# Patient Record
Sex: FEMALE | Race: WHITE | NOT HISPANIC OR LATINO | URBAN - METROPOLITAN AREA
[De-identification: names, ages, dates, MRNs, and addresses within clinical notes are randomized per-mention and may not be internally consistent; named-entity substitution may affect disease eponyms.]

---

## 2020-07-06 ENCOUNTER — OFFICE VISIT (OUTPATIENT)
Dept: URBAN - METROPOLITAN AREA CLINIC 54 | Facility: CLINIC | Age: 60
End: 2020-07-06

## 2020-07-30 ENCOUNTER — WEB ENCOUNTER (OUTPATIENT)
Dept: URBAN - METROPOLITAN AREA CLINIC 54 | Facility: CLINIC | Age: 60
End: 2020-07-30

## 2020-07-30 ENCOUNTER — OFFICE VISIT (OUTPATIENT)
Dept: URBAN - METROPOLITAN AREA CLINIC 54 | Facility: CLINIC | Age: 60
End: 2020-07-30
Payer: COMMERCIAL

## 2020-07-30 DIAGNOSIS — K76.0 NAFLD (NONALCOHOLIC FATTY LIVER DISEASE): ICD-10-CM

## 2020-07-30 DIAGNOSIS — K74.60 CIRRHOSIS: ICD-10-CM

## 2020-07-30 DIAGNOSIS — Z12.11 ENCOUNTER FOR SCREENING COLONOSCOPY: ICD-10-CM

## 2020-07-30 DIAGNOSIS — D73.1 HYPERSPLENISM: ICD-10-CM

## 2020-07-30 DIAGNOSIS — E66.9 OBESITY: ICD-10-CM

## 2020-07-30 PROCEDURE — 99214 OFFICE O/P EST MOD 30 MIN: CPT | Performed by: INTERNAL MEDICINE

## 2020-07-30 RX ORDER — LISINOPRIL 20 MG/1
TAKE 1 TABLET (20 MG) BY ORAL ROUTE ONCE DAILY TABLET ORAL 1
Qty: 0 | Refills: 0 | Status: ACTIVE | COMMUNITY
Start: 1900-01-01

## 2020-07-30 RX ORDER — METFORMIN HYDROCHLORIDE 1000 MG/1
TAKE 1 TABLET (1,000 MG) BY ORAL ROUTE 2 TIMES PER DAY WITH MORNING AND EVENING MEALS TABLET, COATED ORAL 2
Qty: 0 | Refills: 0 | Status: ACTIVE | COMMUNITY
Start: 1900-01-01

## 2020-07-30 RX ORDER — LEVOTHYROXINE SODIUM 25 UG/1
TAKE ONE-HALF TABLET (12.5 MCG) BY ORAL ROUTE ONCE DAILY TABLET ORAL 1
Qty: 0 | Refills: 0 | Status: ACTIVE | COMMUNITY
Start: 1900-01-01

## 2020-07-30 RX ORDER — GLIPIZIDE 5 MG/1
TAKE 1 TABLET (5 MG) BY ORAL ROUTE ONCE DAILY BEFORE A MEAL TABLET ORAL 1
Qty: 0 | Refills: 0 | Status: ACTIVE | COMMUNITY
Start: 1900-01-01

## 2020-07-30 RX ORDER — SODIUM, POTASSIUM,MAG SULFATES 17.5-3.13G
177 ML SOLUTION, RECONSTITUTED, ORAL ORAL ONCE
Qty: 1 | Refills: 0 | OUTPATIENT
Start: 2020-07-30 | End: 2020-07-31

## 2020-07-30 NOTE — HPI-TODAY'S VISIT:
The patient is a 57 year old /White female , who presents on referral from Dr. Jarret Stanley and Brant Grigsby for a gastroenterology evaluation for above issues. Patient was referred to hematology for low WBC counts in Aug 2017. She was noted to be pancytopenic and had a negative workup including BM biopsy. 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 She carries a diagnosis of fatty liver for several years. Risk factors include DM, HTN, obesity. No use of steatogenic medications. No use use of alcohol or herbal supplements. No risk factors for acquisition of viral hepatitis. CT showed ? D1/2 lesions but patient reports having a negative EGD by Dr. Carrasco. Recent labs show WBC 2.1, Hb 11.9, PLT 49. Last colonoscopy was 3 years ago when 2-3 polyps were removed. No signs/symptoms of advanced liver disease. She c/o fatigue and mild ankle swelling.    Follow Up 7/13/2018: Patient presents for routine follow up. She has lost 16 lbs with diet and exercise. No signs/symptoms of advanced liver disease. She was recently given a short course of prednisone for ? reasons. Secondary liver workup showed + AMA and ASMA. No new complaints. She was diagnosed with osteoporosis and is on Ca and Vit D.  Follow Up 1/17/2019: Patient presents for routine follow up. MELD was 7 in April 2018. RUQ USG showd a 2.7 cm intra-abdominal lesion which was not seen on contrast enhanced CT. No liver lesions. She has no complaints and has done well over past 6 months. No signs of progression of liver disese. She reports good diabetic control with last HBA1C was 6%. She has gained 10 lbs.  Follow Up 7/18/2019: Patient presents for routine follow up. She was seen by hematology for leukopenia and low PLT's. No liver related complaints. She cannot lose weight. She is on low carb diet. MELD remains low.  Follow Up 1/13/2020: Patient presents for routine follow up. No liver related complaints. She reports good diabetic control. No signs of progression of liver disease. MRI in Aug 2019 negative for HCC. Weight remains the same at around 200 lbs. She has been seeing Celia Serrano.   Follow Up 7/30/20: Patient presents for routine follow up. No liver related complaints or hospitalizations over past 6 months. No change in weight noted. MELD is 6. No signs of portal hypertension. RUQ USG in Feb 2020 negative for HCC.

## 2020-07-31 LAB
A/G RATIO: 1.7
AFP, SERUM, TUMOR MARKER: 3.8
ALBUMIN: 3.8
ALKALINE PHOSPHATASE: 89
ALT (SGPT): 37
AST (SGOT): 49
BASO (ABSOLUTE): 0
BASOS: 1
BILIRUBIN, TOTAL: 1.6
BUN/CREATININE RATIO: 21
BUN: 14
CALCIUM: 9.6
CARBON DIOXIDE, TOTAL: 22
CHLORIDE: 101
CREATININE: 0.66
EGFR IF AFRICN AM: 111
EGFR IF NONAFRICN AM: 96
EOS (ABSOLUTE): 0.1
EOS: 3
GLOBULIN, TOTAL: 2.3
GLUCOSE: 213
HEMATOCRIT: 38.5
HEMATOLOGY COMMENTS:: (no result)
HEMOGLOBIN: 12.9
IMMATURE CELLS: (no result)
IMMATURE GRANS (ABS): 0
IMMATURE GRANULOCYTES: 0
INR: 1.1
LYMPHS (ABSOLUTE): 1
LYMPHS: 37
MCH: 30.1
MCHC: 33.5
MCV: 90
MONOCYTES(ABSOLUTE): 0.3
MONOCYTES: 11
NEUTROPHILS (ABSOLUTE): 1.3
NEUTROPHILS: 48
NRBC: (no result)
PLATELETS: 50
POTASSIUM: 3.5
PROTEIN, TOTAL: 6.1
PROTHROMBIN TIME: 11.9
RBC: 4.29
RDW: 15.2
SODIUM: 138
WBC: 2.7

## 2020-08-18 ENCOUNTER — OFFICE VISIT (OUTPATIENT)
Dept: URBAN - METROPOLITAN AREA SURGERY CENTER 14 | Facility: SURGERY CENTER | Age: 60
End: 2020-08-18
Payer: COMMERCIAL

## 2020-08-18 DIAGNOSIS — Z12.11 COLON CANCER SCREENING: ICD-10-CM

## 2020-08-18 DIAGNOSIS — D12.3 ADENOMA OF TRANSVERSE COLON: ICD-10-CM

## 2020-08-18 DIAGNOSIS — K63.89 BACTERIAL OVERGROWTH SYNDROME: ICD-10-CM

## 2020-08-18 PROCEDURE — G8907 PT DOC NO EVENTS ON DISCHARG: HCPCS | Performed by: INTERNAL MEDICINE

## 2020-08-18 PROCEDURE — 45380 COLONOSCOPY AND BIOPSY: CPT | Performed by: INTERNAL MEDICINE

## 2020-08-18 PROCEDURE — G9933 CANC DETECTD DURING COL SCRN: HCPCS | Performed by: INTERNAL MEDICINE

## 2021-01-28 ENCOUNTER — WEB ENCOUNTER (OUTPATIENT)
Dept: URBAN - METROPOLITAN AREA CLINIC 54 | Facility: CLINIC | Age: 61
End: 2021-01-28

## 2021-01-28 ENCOUNTER — OFFICE VISIT (OUTPATIENT)
Dept: URBAN - METROPOLITAN AREA CLINIC 54 | Facility: CLINIC | Age: 61
End: 2021-01-28
Payer: COMMERCIAL

## 2021-01-28 DIAGNOSIS — K74.60 CIRRHOSIS: ICD-10-CM

## 2021-01-28 DIAGNOSIS — D73.1 HYPERSPLENISM: ICD-10-CM

## 2021-01-28 DIAGNOSIS — K76.0 NAFLD (NONALCOHOLIC FATTY LIVER DISEASE): ICD-10-CM

## 2021-01-28 DIAGNOSIS — E66.9 OBESITY: ICD-10-CM

## 2021-01-28 DIAGNOSIS — D69.1 THROMBOCYTOPENIA: ICD-10-CM

## 2021-01-28 DIAGNOSIS — Z12.11 ENCOUNTER FOR SCREENING COLONOSCOPY: ICD-10-CM

## 2021-01-28 PROCEDURE — G8482 FLU IMMUNIZE ORDER/ADMIN: HCPCS | Performed by: INTERNAL MEDICINE

## 2021-01-28 PROCEDURE — 3017F COLORECTAL CA SCREEN DOC REV: CPT | Performed by: INTERNAL MEDICINE

## 2021-01-28 PROCEDURE — G8417 CALC BMI ABV UP PARAM F/U: HCPCS | Performed by: INTERNAL MEDICINE

## 2021-01-28 PROCEDURE — 99214 OFFICE O/P EST MOD 30 MIN: CPT | Performed by: INTERNAL MEDICINE

## 2021-01-28 PROCEDURE — G8427 DOCREV CUR MEDS BY ELIG CLIN: HCPCS | Performed by: INTERNAL MEDICINE

## 2021-01-28 PROCEDURE — 1036F TOBACCO NON-USER: CPT | Performed by: INTERNAL MEDICINE

## 2021-01-28 RX ORDER — LISINOPRIL 20 MG/1
TAKE 1 TABLET (20 MG) BY ORAL ROUTE ONCE DAILY TABLET ORAL 1
Qty: 0 | Refills: 0 | Status: ACTIVE | COMMUNITY
Start: 1900-01-01

## 2021-01-28 RX ORDER — GLIPIZIDE 5 MG/1
TAKE 1 TABLET (5 MG) BY ORAL ROUTE ONCE DAILY BEFORE A MEAL TABLET ORAL 1
Qty: 0 | Refills: 0 | Status: ACTIVE | COMMUNITY
Start: 1900-01-01

## 2021-01-28 RX ORDER — LEVOTHYROXINE SODIUM 25 UG/1
TAKE ONE-HALF TABLET (12.5 MCG) BY ORAL ROUTE ONCE DAILY TABLET ORAL 1
Qty: 0 | Refills: 0 | Status: ACTIVE | COMMUNITY
Start: 1900-01-01

## 2021-01-28 RX ORDER — METFORMIN HYDROCHLORIDE 1000 MG/1
TAKE 1 TABLET (1,000 MG) BY ORAL ROUTE 2 TIMES PER DAY WITH MORNING AND EVENING MEALS TABLET, COATED ORAL 2
Qty: 0 | Refills: 0 | Status: ACTIVE | COMMUNITY
Start: 1900-01-01

## 2021-01-28 NOTE — PHYSICAL EXAM SKIN:
no rashes , no suspicious lesions , spiders on chest, no areas of discoloration , no jaundice present , good turgor , no masses , no tenderness on palpation

## 2021-01-29 LAB
A/G RATIO: 1.3
AFP, SERUM, TUMOR MARKER: 1.8
ALBUMIN: 3.5
ALKALINE PHOSPHATASE: 113
ALT (SGPT): 36
AST (SGOT): 50
BASO (ABSOLUTE): 0
BASOS: 1
BILIRUBIN, TOTAL: 1.5
BUN/CREATININE RATIO: 25
BUN: 15
CALCIUM: 9.4
CARBON DIOXIDE, TOTAL: 24
CHLORIDE: 102
CREATININE: 0.6
EGFR IF AFRICN AM: 114
EGFR IF NONAFRICN AM: 99
EOS (ABSOLUTE): 0.2
EOS: 10
GLOBULIN, TOTAL: 2.8
GLUCOSE: 254
HEMATOCRIT: 39.9
HEMATOLOGY COMMENTS:: (no result)
HEMOGLOBIN: 12.8
IMMATURE CELLS: (no result)
IMMATURE GRANS (ABS): (no result)
IMMATURE GRANULOCYTES: (no result)
INR: 1.2
LYMPHS (ABSOLUTE): 0.8
LYMPHS: 32
MCH: 29.4
MCHC: 32.1
MCV: 92
MONOCYTES(ABSOLUTE): 0.1
MONOCYTES: 4
NEUTROPHILS (ABSOLUTE): 1.3
NEUTROPHILS: 53
NRBC: 1
PLATELETS: 51
POTASSIUM: 3.5
PROTEIN, TOTAL: 6.3
PROTHROMBIN TIME: 12.3
RBC: 4.35
RDW: 15.1
SODIUM: 141
WBC: 2.4

## 2021-01-31 ENCOUNTER — TELEPHONE ENCOUNTER (OUTPATIENT)
Dept: URBAN - METROPOLITAN AREA CLINIC 92 | Facility: CLINIC | Age: 61
End: 2021-01-31

## 2021-02-12 ENCOUNTER — OFFICE VISIT (OUTPATIENT)
Dept: URBAN - METROPOLITAN AREA CLINIC 53 | Facility: CLINIC | Age: 61
End: 2021-02-12
Payer: COMMERCIAL

## 2021-02-12 DIAGNOSIS — K74.69 CIRRHOSIS, CRYPTOGENIC: ICD-10-CM

## 2021-02-12 PROCEDURE — 76705 ECHO EXAM OF ABDOMEN: CPT | Performed by: INTERNAL MEDICINE

## 2021-02-12 RX ORDER — GLIPIZIDE 5 MG/1
TAKE 1 TABLET (5 MG) BY ORAL ROUTE ONCE DAILY BEFORE A MEAL TABLET ORAL 1
Qty: 0 | Refills: 0 | Status: ACTIVE | COMMUNITY
Start: 1900-01-01

## 2021-02-12 RX ORDER — LEVOTHYROXINE SODIUM 25 UG/1
TAKE ONE-HALF TABLET (12.5 MCG) BY ORAL ROUTE ONCE DAILY TABLET ORAL 1
Qty: 0 | Refills: 0 | Status: ACTIVE | COMMUNITY
Start: 1900-01-01

## 2021-02-12 RX ORDER — LISINOPRIL 20 MG/1
TAKE 1 TABLET (20 MG) BY ORAL ROUTE ONCE DAILY TABLET ORAL 1
Qty: 0 | Refills: 0 | Status: ACTIVE | COMMUNITY
Start: 1900-01-01

## 2021-02-12 RX ORDER — METFORMIN HYDROCHLORIDE 1000 MG/1
TAKE 1 TABLET (1,000 MG) BY ORAL ROUTE 2 TIMES PER DAY WITH MORNING AND EVENING MEALS TABLET, COATED ORAL 2
Qty: 0 | Refills: 0 | Status: ACTIVE | COMMUNITY
Start: 1900-01-01

## 2021-03-09 PROBLEM — 255417007 CIRRHOTIC: Status: ACTIVE | Noted: 2021-03-09

## 2021-03-11 ENCOUNTER — OFFICE VISIT (OUTPATIENT)
Dept: URBAN - METROPOLITAN AREA MEDICAL CENTER 23 | Facility: MEDICAL CENTER | Age: 61
End: 2021-03-11
Payer: COMMERCIAL

## 2021-03-11 ENCOUNTER — TELEPHONE ENCOUNTER (OUTPATIENT)
Dept: URBAN - METROPOLITAN AREA CLINIC 92 | Facility: CLINIC | Age: 61
End: 2021-03-11

## 2021-03-11 DIAGNOSIS — K29.60 ADENOPAPILLOMATOSIS GASTRICA: ICD-10-CM

## 2021-03-11 DIAGNOSIS — R18.8 ABDOMINAL FLUID COLLECTION: ICD-10-CM

## 2021-03-11 DIAGNOSIS — K74.60 ADVANCED CIRRHOSIS OF LIVER: ICD-10-CM

## 2021-03-11 PROCEDURE — 43239 EGD BIOPSY SINGLE/MULTIPLE: CPT | Performed by: INTERNAL MEDICINE

## 2021-03-11 RX ORDER — LEVOTHYROXINE SODIUM 25 UG/1
TAKE ONE-HALF TABLET (12.5 MCG) BY ORAL ROUTE ONCE DAILY TABLET ORAL 1
Qty: 0 | Refills: 0 | Status: ACTIVE | COMMUNITY
Start: 1900-01-01

## 2021-03-11 RX ORDER — GLIPIZIDE 5 MG/1
TAKE 1 TABLET (5 MG) BY ORAL ROUTE ONCE DAILY BEFORE A MEAL TABLET ORAL 1
Qty: 0 | Refills: 0 | Status: ACTIVE | COMMUNITY
Start: 1900-01-01

## 2021-03-11 RX ORDER — METFORMIN HYDROCHLORIDE 1000 MG/1
TAKE 1 TABLET (1,000 MG) BY ORAL ROUTE 2 TIMES PER DAY WITH MORNING AND EVENING MEALS TABLET, COATED ORAL 2
Qty: 0 | Refills: 0 | Status: ACTIVE | COMMUNITY
Start: 1900-01-01

## 2021-03-11 RX ORDER — LISINOPRIL 20 MG/1
TAKE 1 TABLET (20 MG) BY ORAL ROUTE ONCE DAILY TABLET ORAL 1
Qty: 0 | Refills: 0 | Status: ACTIVE | COMMUNITY
Start: 1900-01-01

## 2021-03-11 RX ORDER — NADOLOL 20 MG/1
1 TABLET TABLET ORAL ONCE A DAY
Qty: 30 TABLET | Refills: 3 | OUTPATIENT
Start: 2021-03-11

## 2021-08-23 ENCOUNTER — OFFICE VISIT (OUTPATIENT)
Dept: URBAN - METROPOLITAN AREA CLINIC 54 | Facility: CLINIC | Age: 61
End: 2021-08-23

## 2021-08-30 ENCOUNTER — OFFICE VISIT (OUTPATIENT)
Dept: URBAN - METROPOLITAN AREA CLINIC 54 | Facility: CLINIC | Age: 61
End: 2021-08-30
Payer: COMMERCIAL

## 2021-08-30 ENCOUNTER — WEB ENCOUNTER (OUTPATIENT)
Dept: URBAN - METROPOLITAN AREA CLINIC 54 | Facility: CLINIC | Age: 61
End: 2021-08-30

## 2021-08-30 DIAGNOSIS — K74.60 CIRRHOSIS OF LIVER: ICD-10-CM

## 2021-08-30 DIAGNOSIS — D73.1 HYPERSPLENISM: ICD-10-CM

## 2021-08-30 DIAGNOSIS — Z12.11 ENCOUNTER FOR SCREENING COLONOSCOPY: ICD-10-CM

## 2021-08-30 DIAGNOSIS — K76.0 NAFLD (NONALCOHOLIC FATTY LIVER DISEASE): ICD-10-CM

## 2021-08-30 DIAGNOSIS — E66.9 OBESITY: ICD-10-CM

## 2021-08-30 DIAGNOSIS — D69.1 THROMBOCYTOPENIA: ICD-10-CM

## 2021-08-30 PROCEDURE — 99214 OFFICE O/P EST MOD 30 MIN: CPT | Performed by: INTERNAL MEDICINE

## 2021-08-30 RX ORDER — LISINOPRIL 20 MG/1
TAKE 1 TABLET (20 MG) BY ORAL ROUTE ONCE DAILY TABLET ORAL 1
Qty: 0 | Refills: 0 | Status: ACTIVE | COMMUNITY
Start: 1900-01-01

## 2021-08-30 RX ORDER — GLIPIZIDE 5 MG/1
TAKE 1 TABLET (5 MG) BY ORAL ROUTE ONCE DAILY BEFORE A MEAL TABLET ORAL 1
Qty: 0 | Refills: 0 | Status: ACTIVE | COMMUNITY
Start: 1900-01-01

## 2021-08-30 RX ORDER — NADOLOL 20 MG/1
1 TABLET TABLET ORAL ONCE A DAY
Qty: 30 TABLET | Refills: 3 | Status: ACTIVE | COMMUNITY
Start: 2021-03-11

## 2021-08-30 RX ORDER — LEVOTHYROXINE SODIUM 25 UG/1
TAKE ONE-HALF TABLET (12.5 MCG) BY ORAL ROUTE ONCE DAILY TABLET ORAL 1
Qty: 0 | Refills: 0 | Status: ACTIVE | COMMUNITY
Start: 1900-01-01

## 2021-08-30 RX ORDER — METFORMIN HYDROCHLORIDE 1000 MG/1
TAKE 1 TABLET (1,000 MG) BY ORAL ROUTE 2 TIMES PER DAY WITH MORNING AND EVENING MEALS TABLET, COATED ORAL 2
Qty: 0 | Refills: 0 | Status: ACTIVE | COMMUNITY
Start: 1900-01-01

## 2021-08-30 NOTE — HPI-TODAY'S VISIT:
The patient is a 57 year old /White female , who presents on referral from Dr. Jarret Stanley and Brant Grigsby for a gastroenterology evaluation for above issues. Patient was referred to hematology for low WBC counts in Aug 2017. She was noted to be pancytopenic and had a negative workup including BM biopsy.   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 She carries a diagnosis of fatty liver for several years. Risk factors include DM, HTN, obesity. No use of steatogenic medications. No use use of alcohol or herbal supplements. No risk factors for acquisition of viral hepatitis. CT showed ? D1/2 lesions but patient reports having a negative EGD by Dr. Carrasoc. Recent labs show WBC 2.1, Hb 11.9, PLT 49. Last colonoscopy was 3 years ago when 2-3 polyps were removed. No signs/symptoms of advanced liver disease. She c/o fatigue and mild ankle swelling.    Follow Up 7/13/2018: Patient presents for routine follow up. She has lost 16 lbs with diet and exercise. No signs/symptoms of advanced liver disease. She was recently given a short course of prednisone for ? reasons. Secondary liver workup showed + AMA and ASMA. No new complaints. She was diagnosed with osteoporosis and is on Ca and Vit D.  Follow Up 1/17/2019: Patient presents for routine follow up. MELD was 7 in April 2018. RUQ USG showd a 2.7 cm intra-abdominal lesion which was not seen on contrast enhanced CT. No liver lesions. She has no complaints and has done well over past 6 months. No signs of progression of liver disese. She reports good diabetic control with last HBA1C was 6%. She has gained 10 lbs.  Follow Up 7/18/2019: Patient presents for routine follow up. She was seen by hematology for leukopenia and low PLT's. No liver related complaints. She cannot lose weight. She is on low carb diet. MELD remains low.  Follow Up 1/13/2020: Patient presents for routine follow up. No liver related complaints. She reports good diabetic control. No signs of progression of liver disease. MRI in Aug 2019 negative for HCC. Weight remains the same at around 200 lbs. She has been seeing Celia Srerano.   Follow Up 7/30/20: Patient presents for routine follow up. No liver related complaints or hospitalizations over past 6 months. No change in weight noted. MELD is 6. No signs of portal hypertension. RUQ USG in Feb 2020 negative for HCC.  Follow Up 1/28/21: Patient presents for routine follow up. She had mild Covid infection in Dec 2020. No liver related complaints. Colon in Aug 2020 revelaed 5 small polyps (Hyperplastic and TA's), diverticulosis and hemorrhoids. HCC screen negative Aug 2020.  Follow Up 8/30/21: Patient presents for routine follow up. No liver related complaints. HCC screen negative March 2021. No new complaints. No signs of portal hypertension.

## 2021-08-31 LAB
A/G RATIO: 1.7
AFP, SERUM, TUMOR MARKER: 3.3
ALBUMIN: 3.6
ALKALINE PHOSPHATASE: 99
ALT (SGPT): 32
AST (SGOT): 42
BASO (ABSOLUTE): 0
BASOS: 2
BILIRUBIN, TOTAL: 1.8
BUN/CREATININE RATIO: 18
BUN: 12
CALCIUM: 9.1
CARBON DIOXIDE, TOTAL: 23
CHLORIDE: 102
CREATININE: 0.67
EGFR IF AFRICN AM: 110
EGFR IF NONAFRICN AM: 95
EOS (ABSOLUTE): 0.1
EOS: 5
GLOBULIN, TOTAL: 2.1
GLUCOSE: 203
HEMATOCRIT: 32.7
HEMATOLOGY COMMENTS:: (no result)
HEMOGLOBIN: 10.3
IMMATURE CELLS: (no result)
IMMATURE GRANS (ABS): (no result)
IMMATURE GRANULOCYTES: (no result)
INR: 1.1
LYMPHS (ABSOLUTE): 0.5
LYMPHS: 26
MCH: 28
MCHC: 31.5
MCV: 89
MONOCYTES(ABSOLUTE): 0.1
MONOCYTES: 4
NEUTROPHILS (ABSOLUTE): 1.3
NEUTROPHILS: 63
NRBC: 1
PLATELETS: 47
POTASSIUM: 3.3
PROTEIN, TOTAL: 5.7
PROTHROMBIN TIME: 12
RBC: 3.68
RDW: 14.7
SODIUM: 140
WBC: 2

## 2021-09-10 ENCOUNTER — OFFICE VISIT (OUTPATIENT)
Dept: URBAN - METROPOLITAN AREA CLINIC 53 | Facility: CLINIC | Age: 61
End: 2021-09-10

## 2021-10-06 ENCOUNTER — OFFICE VISIT (OUTPATIENT)
Dept: URBAN - METROPOLITAN AREA CLINIC 53 | Facility: CLINIC | Age: 61
End: 2021-10-06
Payer: COMMERCIAL

## 2021-10-06 DIAGNOSIS — K76.89 LESION OF LIVER: ICD-10-CM

## 2021-10-06 DIAGNOSIS — R16.1 SPLENOMEGALY: ICD-10-CM

## 2021-10-06 DIAGNOSIS — K74.69 OTHER CIRRHOSIS OF LIVER: ICD-10-CM

## 2021-10-06 PROCEDURE — 76705 ECHO EXAM OF ABDOMEN: CPT | Performed by: INTERNAL MEDICINE

## 2021-10-06 RX ORDER — METFORMIN HYDROCHLORIDE 1000 MG/1
TAKE 1 TABLET (1,000 MG) BY ORAL ROUTE 2 TIMES PER DAY WITH MORNING AND EVENING MEALS TABLET, COATED ORAL 2
Qty: 0 | Refills: 0 | Status: ACTIVE | COMMUNITY
Start: 1900-01-01

## 2021-10-06 RX ORDER — LISINOPRIL 20 MG/1
TAKE 1 TABLET (20 MG) BY ORAL ROUTE ONCE DAILY TABLET ORAL 1
Qty: 0 | Refills: 0 | Status: ACTIVE | COMMUNITY
Start: 1900-01-01

## 2021-10-06 RX ORDER — LEVOTHYROXINE SODIUM 25 UG/1
TAKE ONE-HALF TABLET (12.5 MCG) BY ORAL ROUTE ONCE DAILY TABLET ORAL 1
Qty: 0 | Refills: 0 | Status: ACTIVE | COMMUNITY
Start: 1900-01-01

## 2021-10-06 RX ORDER — GLIPIZIDE 5 MG/1
TAKE 1 TABLET (5 MG) BY ORAL ROUTE ONCE DAILY BEFORE A MEAL TABLET ORAL 1
Qty: 0 | Refills: 0 | Status: ACTIVE | COMMUNITY
Start: 1900-01-01

## 2021-10-06 RX ORDER — NADOLOL 20 MG/1
1 TABLET TABLET ORAL ONCE A DAY
Qty: 30 TABLET | Refills: 3 | Status: ACTIVE | COMMUNITY
Start: 2021-03-11

## 2021-10-08 ENCOUNTER — TELEPHONE ENCOUNTER (OUTPATIENT)
Dept: URBAN - METROPOLITAN AREA CLINIC 92 | Facility: CLINIC | Age: 61
End: 2021-10-08

## 2021-10-08 PROBLEM — 300331000: Status: ACTIVE | Noted: 2021-10-08

## 2021-10-25 ENCOUNTER — LAB OUTSIDE AN ENCOUNTER (OUTPATIENT)
Dept: URBAN - METROPOLITAN AREA CLINIC 54 | Facility: CLINIC | Age: 61
End: 2021-10-25

## 2021-10-25 LAB
CREATININE POC: 0.5
PERFORMING LAB: (no result)

## 2022-03-18 ENCOUNTER — WEB ENCOUNTER (OUTPATIENT)
Dept: URBAN - METROPOLITAN AREA CLINIC 54 | Facility: CLINIC | Age: 62
End: 2022-03-18

## 2022-03-18 ENCOUNTER — OFFICE VISIT (OUTPATIENT)
Dept: URBAN - METROPOLITAN AREA CLINIC 54 | Facility: CLINIC | Age: 62
End: 2022-03-18
Payer: COMMERCIAL

## 2022-03-18 VITALS
SYSTOLIC BLOOD PRESSURE: 145 MMHG | DIASTOLIC BLOOD PRESSURE: 69 MMHG | HEART RATE: 74 BPM | WEIGHT: 203.4 LBS | HEIGHT: 64 IN | BODY MASS INDEX: 34.72 KG/M2 | TEMPERATURE: 97.2 F

## 2022-03-18 DIAGNOSIS — D73.1 HYPERSPLENISM: ICD-10-CM

## 2022-03-18 DIAGNOSIS — D69.1 THROMBOCYTOPENIA: ICD-10-CM

## 2022-03-18 DIAGNOSIS — K31.89 OTHER DISEASES OF STOMACH AND DUODENUM: ICD-10-CM

## 2022-03-18 DIAGNOSIS — K76.6 PORTAL HYPERTENSION: ICD-10-CM

## 2022-03-18 DIAGNOSIS — I85.00 ESOPHAGEAL VARICES DETERMINED BY ENDOSCOPY: ICD-10-CM

## 2022-03-18 DIAGNOSIS — K76.0 NAFLD (NONALCOHOLIC FATTY LIVER DISEASE): ICD-10-CM

## 2022-03-18 DIAGNOSIS — E66.9 OBESITY: ICD-10-CM

## 2022-03-18 DIAGNOSIS — K44.9 HIATAL HERNIA: ICD-10-CM

## 2022-03-18 DIAGNOSIS — K74.60 CIRRHOSIS OF LIVER: ICD-10-CM

## 2022-03-18 DIAGNOSIS — I86.4 GASTRIC VARICES: ICD-10-CM

## 2022-03-18 DIAGNOSIS — Z12.11 ENCOUNTER FOR SCREENING COLONOSCOPY: ICD-10-CM

## 2022-03-18 PROBLEM — 303082009: Status: ACTIVE | Noted: 2022-03-18

## 2022-03-18 PROBLEM — 197315008 NAFLD - NONALCOHOLIC FATTY LIVER DISEASE: Status: ACTIVE | Noted: 2021-08-30

## 2022-03-18 PROBLEM — 414916001 OBESITY: Status: ACTIVE | Noted: 2021-08-30

## 2022-03-18 PROBLEM — 302215000 THROMBOCYTOPENIA: Status: ACTIVE | Noted: 2021-01-28

## 2022-03-18 PROBLEM — 28670008: Status: ACTIVE | Noted: 2022-03-18

## 2022-03-18 PROCEDURE — 99213 OFFICE O/P EST LOW 20 MIN: CPT | Performed by: INTERNAL MEDICINE

## 2022-03-18 RX ORDER — GLIPIZIDE 5 MG/1
TAKE 1 TABLET (5 MG) BY ORAL ROUTE ONCE DAILY BEFORE A MEAL TABLET ORAL 1
Qty: 0 | Refills: 0 | Status: ACTIVE | COMMUNITY
Start: 1900-01-01

## 2022-03-18 RX ORDER — NADOLOL 20 MG/1
1 TABLET TABLET ORAL ONCE A DAY
Qty: 30 TABLET | Refills: 3 | Status: ACTIVE | COMMUNITY
Start: 2021-03-11

## 2022-03-18 RX ORDER — METFORMIN HYDROCHLORIDE 1000 MG/1
TAKE 1 TABLET (1,000 MG) BY ORAL ROUTE 2 TIMES PER DAY WITH MORNING AND EVENING MEALS TABLET, COATED ORAL 2
Qty: 0 | Refills: 0 | Status: ACTIVE | COMMUNITY
Start: 1900-01-01

## 2022-03-18 RX ORDER — LISINOPRIL 20 MG/1
TAKE 1 TABLET (20 MG) BY ORAL ROUTE ONCE DAILY TABLET ORAL 1
Qty: 0 | Refills: 0 | Status: ACTIVE | COMMUNITY
Start: 1900-01-01

## 2022-03-18 RX ORDER — VITAMIN A 2400 MCG
AS DIRECTED CAPSULE ORAL
Status: ACTIVE | COMMUNITY

## 2022-03-18 RX ORDER — LEVOTHYROXINE SODIUM 25 UG/1
TAKE ONE-HALF TABLET (12.5 MCG) BY ORAL ROUTE ONCE DAILY TABLET ORAL 1
Qty: 0 | Refills: 0 | Status: ACTIVE | COMMUNITY
Start: 1900-01-01

## 2022-03-18 RX ORDER — FAMOTIDINE 40 MG/1
1 TABLET AT BEDTIME AS NEEDED TABLET, FILM COATED ORAL ONCE A DAY
Status: ACTIVE | COMMUNITY

## 2022-03-18 NOTE — HPI-TODAY'S VISIT:
The patient is a 57 year old /White female , who presents on referral from Dr. Jarret Stanley and Brant Grigsby for a gastroenterology evaluation for above issues. Patient was referred to hematology for low WBC counts in Aug 2017. She was noted to be pancytopenic and had a negative workup including BM biopsy.   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 She carries a diagnosis of fatty liver for several years. Risk factors include DM, HTN, obesity. No use of steatogenic medications. No use use of alcohol or herbal supplements. No risk factors for acquisition of viral hepatitis. CT showed ? D1/2 lesions but patient reports having a negative EGD by Dr. Carrasco. Recent labs show WBC 2.1, Hb 11.9, PLT 49. Last colonoscopy was 3 years ago when 2-3 polyps were removed. No signs/symptoms of advanced liver disease. She c/o fatigue and mild ankle swelling.    Follow Up 7/13/2018: Patient presents for routine follow up. She has lost 16 lbs with diet and exercise. No signs/symptoms of advanced liver disease. She was recently given a short course of prednisone for ? reasons. Secondary liver workup showed + AMA and ASMA. No new complaints. She was diagnosed with osteoporosis and is on Ca and Vit D.  Follow Up 1/17/2019: Patient presents for routine follow up. MELD was 7 in April 2018. RUQ USG showd a 2.7 cm intra-abdominal lesion which was not seen on contrast enhanced CT. No liver lesions. She has no complaints and has done well over past 6 months. No signs of progression of liver disese. She reports good diabetic control with last HBA1C was 6%. She has gained 10 lbs.  Follow Up 7/18/2019: Patient presents for routine follow up. She was seen by hematology for leukopenia and low PLT's. No liver related complaints. She cannot lose weight. She is on low carb diet. MELD remains low.  Follow Up 1/13/2020: Patient presents for routine follow up. No liver related complaints. She reports good diabetic control. No signs of progression of liver disease. MRI in Aug 2019 negative for HCC. Weight remains the same at around 200 lbs. She has been seeing Celia Serrano.   Follow Up 7/30/20: Patient presents for routine follow up. No liver related complaints or hospitalizations over past 6 months. No change in weight noted. MELD is 6. No signs of portal hypertension. RUQ USG in Feb 2020 negative for HCC.  Follow Up 1/28/21: Patient presents for routine follow up. She had mild Covid infection in Dec 2020. No liver related complaints. Colon in Aug 2020 revelaed 5 small polyps (Hyperplastic and TA's), diverticulosis and hemorrhoids. HCC screen negative Aug 2020.  Follow Up 8/30/21: Patient presents for routine follow up. No liver related complaints. HCC screen negative March 2021. No new complaints. No signs of portal hypertension.  Follow Up 3/18/22: Patient presents for routine follow up. No liver related complaints or hospitalizations. Liver function remains stable. USG showed a 1 cm lesion which was not seen on a 4 phase MRI in October 2021. No signs of portal hypertension.

## 2022-03-19 LAB
A/G RATIO: 1.7
AFP, SERUM, TUMOR MARKER: 2.8
ALBUMIN: 3.6
ALKALINE PHOSPHATASE: 123
ALT (SGPT): 32
AST (SGOT): 46
BASO (ABSOLUTE): 0
BASOS: 1
BILIRUBIN, TOTAL: 2.3
BUN/CREATININE RATIO: 23
BUN: 13
CALCIUM: 9
CARBON DIOXIDE, TOTAL: 23
CHLORIDE: 104
CHOLESTEROL, TOTAL: 128
COMMENT:: (no result)
CREATININE: 0.57
EGFR: 103
EOS (ABSOLUTE): 0.2
EOS: 6
GLOBULIN, TOTAL: 2.1
GLUCOSE: 215
HDL CHOLESTEROL: 52
HEMATOCRIT: 39.2
HEMATOLOGY COMMENTS:: (no result)
HEMOGLOBIN A1C: 6.4
HEMOGLOBIN: 12.4
IMMATURE CELLS: (no result)
IMMATURE GRANS (ABS): 0
IMMATURE GRANULOCYTES: 0
INR: 1.2
LDL CHOL CALC (NIH): 58
LYMPHS (ABSOLUTE): 0.7
LYMPHS: 29
MCH: 28.6
MCHC: 31.6
MCV: 91
MONOCYTES(ABSOLUTE): 0.3
MONOCYTES: 10
NEUTROPHILS (ABSOLUTE): 1.4
NEUTROPHILS: 54
NRBC: (no result)
PLATELETS: 58
POTASSIUM: 3.5
PROTEIN, TOTAL: 5.7
PROTHROMBIN TIME: 12.4
RBC: 4.33
RDW: 17.4
SODIUM: 143
TRIGLYCERIDES: 98
VLDL CHOLESTEROL CAL: 18
WBC: 2.5

## 2022-03-21 ENCOUNTER — TELEPHONE ENCOUNTER (OUTPATIENT)
Dept: URBAN - METROPOLITAN AREA CLINIC 92 | Facility: CLINIC | Age: 62
End: 2022-03-21

## 2022-04-20 ENCOUNTER — OFFICE VISIT (OUTPATIENT)
Dept: URBAN - METROPOLITAN AREA CLINIC 53 | Facility: CLINIC | Age: 62
End: 2022-04-20
Payer: COMMERCIAL

## 2022-04-20 DIAGNOSIS — R16.1 SPLENOMEGALY: ICD-10-CM

## 2022-04-20 DIAGNOSIS — K74.60 CIRRHOSIS: ICD-10-CM

## 2022-04-20 PROCEDURE — 76705 ECHO EXAM OF ABDOMEN: CPT | Performed by: INTERNAL MEDICINE

## 2022-04-20 RX ORDER — LEVOTHYROXINE SODIUM 25 UG/1
TAKE ONE-HALF TABLET (12.5 MCG) BY ORAL ROUTE ONCE DAILY TABLET ORAL 1
Qty: 0 | Refills: 0 | Status: ACTIVE | COMMUNITY
Start: 1900-01-01

## 2022-04-20 RX ORDER — METFORMIN HYDROCHLORIDE 1000 MG/1
TAKE 1 TABLET (1,000 MG) BY ORAL ROUTE 2 TIMES PER DAY WITH MORNING AND EVENING MEALS TABLET, COATED ORAL 2
Qty: 0 | Refills: 0 | Status: ACTIVE | COMMUNITY
Start: 1900-01-01

## 2022-04-20 RX ORDER — LISINOPRIL 20 MG/1
TAKE 1 TABLET (20 MG) BY ORAL ROUTE ONCE DAILY TABLET ORAL 1
Qty: 0 | Refills: 0 | Status: ACTIVE | COMMUNITY
Start: 1900-01-01

## 2022-04-20 RX ORDER — NADOLOL 20 MG/1
1 TABLET TABLET ORAL ONCE A DAY
Qty: 30 TABLET | Refills: 3 | Status: ACTIVE | COMMUNITY
Start: 2021-03-11

## 2022-04-20 RX ORDER — FAMOTIDINE 40 MG/1
1 TABLET AT BEDTIME AS NEEDED TABLET, FILM COATED ORAL ONCE A DAY
Status: ACTIVE | COMMUNITY

## 2022-04-20 RX ORDER — GLIPIZIDE 5 MG/1
TAKE 1 TABLET (5 MG) BY ORAL ROUTE ONCE DAILY BEFORE A MEAL TABLET ORAL 1
Qty: 0 | Refills: 0 | Status: ACTIVE | COMMUNITY
Start: 1900-01-01

## 2022-04-20 RX ORDER — VITAMIN A 2400 MCG
AS DIRECTED CAPSULE ORAL
Status: ACTIVE | COMMUNITY

## 2022-09-12 ENCOUNTER — OFFICE VISIT (OUTPATIENT)
Dept: URBAN - METROPOLITAN AREA CLINIC 54 | Facility: CLINIC | Age: 62
End: 2022-09-12

## 2023-02-21 ENCOUNTER — OUT OF OFFICE VISIT (OUTPATIENT)
Dept: URBAN - METROPOLITAN AREA MEDICAL CENTER 23 | Facility: MEDICAL CENTER | Age: 63
End: 2023-02-21

## 2023-02-21 ENCOUNTER — CLAIMS CREATED FROM THE CLAIM WINDOW (OUTPATIENT)
Dept: URBAN - METROPOLITAN AREA MEDICAL CENTER 23 | Facility: MEDICAL CENTER | Age: 63
End: 2023-02-21
Payer: COMMERCIAL

## 2023-02-21 DIAGNOSIS — K92.0 BLOODY EMESIS: ICD-10-CM

## 2023-02-21 DIAGNOSIS — K74.60 ADVANCED CIRRHOSIS: ICD-10-CM

## 2023-02-21 DIAGNOSIS — K75.81 CHRONIC STEATOHEPATITIS, NONALCOHOLIC: ICD-10-CM

## 2023-02-21 DIAGNOSIS — K31.89 ACQUIRED DEFORMITY OF DUODENUM: ICD-10-CM

## 2023-02-21 DIAGNOSIS — D62 ABLA (ACUTE BLOOD LOSS ANEMIA): ICD-10-CM

## 2023-02-21 DIAGNOSIS — I85.10 ESOPH VARICE OTHER DIS: ICD-10-CM

## 2023-02-21 DIAGNOSIS — R18.8 ABDOMINAL ASCITES: ICD-10-CM

## 2023-02-21 PROCEDURE — 99223 1ST HOSP IP/OBS HIGH 75: CPT | Performed by: INTERNAL MEDICINE

## 2023-02-21 PROCEDURE — 43235 EGD DIAGNOSTIC BRUSH WASH: CPT | Performed by: INTERNAL MEDICINE

## 2023-02-21 PROCEDURE — G8427 DOCREV CUR MEDS BY ELIG CLIN: HCPCS | Performed by: INTERNAL MEDICINE

## 2023-02-28 ENCOUNTER — TELEPHONE ENCOUNTER (OUTPATIENT)
Dept: URBAN - METROPOLITAN AREA CLINIC 92 | Facility: CLINIC | Age: 63
End: 2023-02-28

## 2023-03-13 ENCOUNTER — ERX REFILL RESPONSE (OUTPATIENT)
Dept: URBAN - METROPOLITAN AREA CLINIC 54 | Facility: CLINIC | Age: 63
End: 2023-03-13

## 2023-03-13 ENCOUNTER — OFFICE VISIT (OUTPATIENT)
Dept: URBAN - METROPOLITAN AREA CLINIC 54 | Facility: CLINIC | Age: 63
End: 2023-03-13
Payer: COMMERCIAL

## 2023-03-13 ENCOUNTER — LAB OUTSIDE AN ENCOUNTER (OUTPATIENT)
Dept: URBAN - METROPOLITAN AREA CLINIC 54 | Facility: CLINIC | Age: 63
End: 2023-03-13

## 2023-03-13 VITALS
BODY MASS INDEX: 29.53 KG/M2 | SYSTOLIC BLOOD PRESSURE: 99 MMHG | HEART RATE: 101 BPM | TEMPERATURE: 98.1 F | WEIGHT: 173 LBS | HEIGHT: 64 IN | DIASTOLIC BLOOD PRESSURE: 86 MMHG

## 2023-03-13 DIAGNOSIS — K44.9 HIATAL HERNIA: ICD-10-CM

## 2023-03-13 DIAGNOSIS — I86.4 GASTRIC VARICES: ICD-10-CM

## 2023-03-13 DIAGNOSIS — D73.1 HYPERSPLENISM: ICD-10-CM

## 2023-03-13 DIAGNOSIS — K76.6 PORTAL HYPERTENSION: ICD-10-CM

## 2023-03-13 DIAGNOSIS — Z12.11 ENCOUNTER FOR SCREENING COLONOSCOPY: ICD-10-CM

## 2023-03-13 DIAGNOSIS — D69.1 THROMBOCYTOPENIA: ICD-10-CM

## 2023-03-13 DIAGNOSIS — K74.60 CIRRHOSIS OF LIVER: ICD-10-CM

## 2023-03-13 DIAGNOSIS — K76.0 NAFLD (NONALCOHOLIC FATTY LIVER DISEASE): ICD-10-CM

## 2023-03-13 DIAGNOSIS — K31.89 OTHER DISEASES OF STOMACH AND DUODENUM: ICD-10-CM

## 2023-03-13 DIAGNOSIS — E66.9 OBESITY: ICD-10-CM

## 2023-03-13 DIAGNOSIS — I85.00 ESOPHAGEAL VARICES DETERMINED BY ENDOSCOPY: ICD-10-CM

## 2023-03-13 PROCEDURE — 99214 OFFICE O/P EST MOD 30 MIN: CPT | Performed by: INTERNAL MEDICINE

## 2023-03-13 RX ORDER — LORATADINE 10 MG
1 PACKET MIXED WITH 8 OUNCES OF FLUID TABLET,DISINTEGRATING ORAL ONCE A DAY
Status: ACTIVE | COMMUNITY

## 2023-03-13 RX ORDER — SOD SULF/POT CHLORIDE/MAG SULF 1.479 G
AS DIRECTED TABLET ORAL
Qty: 1 | Refills: 0 | OUTPATIENT
Start: 2023-03-13 | End: 2023-03-14

## 2023-03-13 RX ORDER — NADOLOL 20 MG/1
TAKE 2 TABLETS BY MOUTH EVERY DAY TABLET ORAL
Qty: 180 TABLET | Refills: 0 | OUTPATIENT

## 2023-03-13 RX ORDER — BUMETANIDE 1 MG/1
1 TABLET TABLET ORAL ONCE A DAY
Qty: 30 | Refills: 3 | OUTPATIENT
Start: 2023-03-13

## 2023-03-13 RX ORDER — INSULIN LISPRO 100 U/ML
AS DIRECTED INJECTION, SOLUTION INTRAVENOUS; SUBCUTANEOUS
Status: ACTIVE | COMMUNITY

## 2023-03-13 RX ORDER — BUMETANIDE 0.5 MG/1
1 TABLET TABLET ORAL ONCE A DAY
Status: ACTIVE | COMMUNITY

## 2023-03-13 RX ORDER — NADOLOL 20 MG/1
2 TABLETS TABLET ORAL ONCE A DAY
Qty: 60 | OUTPATIENT
Start: 2023-03-13

## 2023-03-13 RX ORDER — CIPROFLOXACIN HYDROCHLORIDE 500 MG/1
1 TABLET TABLET, FILM COATED ORAL
Status: ACTIVE | COMMUNITY

## 2023-03-13 RX ORDER — INSULIN GLARGINE 100 [IU]/ML
AS DIRECTED INJECTION, SOLUTION SUBCUTANEOUS
Status: ACTIVE | COMMUNITY

## 2023-03-13 RX ORDER — SPIRONOLACTONE 100 MG/1
1 TABLET TABLET, FILM COATED ORAL ONCE A DAY
Qty: 30 | Refills: 3 | OUTPATIENT
Start: 2023-03-13 | End: 2023-07-11

## 2023-03-13 RX ORDER — NADOLOL 20 MG/1
2 TABLETS TABLET ORAL ONCE A DAY
Qty: 60 | OUTPATIENT

## 2023-03-13 RX ORDER — PANTOPRAZOLE SODIUM 40 MG/1
1 TABLET TABLET, DELAYED RELEASE ORAL ONCE A DAY
Status: ACTIVE | COMMUNITY

## 2023-03-13 RX ORDER — SPIRONOLACTONE 50 MG/1
1 TABLET TABLET, FILM COATED ORAL
Status: ACTIVE | COMMUNITY

## 2023-03-13 NOTE — HPI-TODAY'S VISIT:
The patient is a 57 year old /White female , who presents on referral from Dr. Jarret Stanley and Brant Grigsby for a gastroenterology evaluation for above issues. Patient was referred to hematology for low WBC counts in Aug 2017. She was noted to be pancytopenic and had a negative workup including BM biopsy.   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 She carries a diagnosis of fatty liver for several years. Risk factors include DM, HTN, obesity. No use of steatogenic medications. No use use of alcohol or herbal supplements. No risk factors for acquisition of viral hepatitis. CT showed ? D1/2 lesions but patient reports having a negative EGD by Dr. Carrasco. Recent labs show WBC 2.1, Hb 11.9, PLT 49. Last colonoscopy was 3 years ago when 2-3 polyps were removed. No signs/symptoms of advanced liver disease. She c/o fatigue and mild ankle swelling.    Follow Up 7/13/2018: Patient presents for routine follow up. She has lost 16 lbs with diet and exercise. No signs/symptoms of advanced liver disease. She was recently given a short course of prednisone for ? reasons. Secondary liver workup showed + AMA and ASMA. No new complaints. She was diagnosed with osteoporosis and is on Ca and Vit D.  Follow Up 1/17/2019: Patient presents for routine follow up. MELD was 7 in April 2018. RUQ USG showd a 2.7 cm intra-abdominal lesion which was not seen on contrast enhanced CT. No liver lesions. She has no complaints and has done well over past 6 months. No signs of progression of liver disese. She reports good diabetic control with last HBA1C was 6%. She has gained 10 lbs.  Follow Up 7/18/2019: Patient presents for routine follow up. She was seen by hematology for leukopenia and low PLT's. No liver related complaints. She cannot lose weight. She is on low carb diet. MELD remains low.  Follow Up 1/13/2020: Patient presents for routine follow up. No liver related complaints. She reports good diabetic control. No signs of progression of liver disease. MRI in Aug 2019 negative for HCC. Weight remains the same at around 200 lbs. She has been seeing Celia Serrano.   Follow Up 7/30/20: Patient presents for routine follow up. No liver related complaints or hospitalizations over past 6 months. No change in weight noted. MELD is 6. No signs of portal hypertension. RUQ USG in Feb 2020 negative for HCC.  Follow Up 1/28/21: Patient presents for routine follow up. She had mild Covid infection in Dec 2020. No liver related complaints. Colon in Aug 2020 revelaed 5 small polyps (Hyperplastic and TA's), diverticulosis and hemorrhoids. HCC screen negative Aug 2020.  Follow Up 8/30/21: Patient presents for routine follow up. No liver related complaints. HCC screen negative March 2021. No new complaints. No signs of portal hypertension.  Follow Up 3/18/22: Patient presents for routine follow up. No liver related complaints or hospitalizations. Liver function remains stable. USG showed a 1 cm lesion which was not seen on a 4 phase MRI in October 2021. No signs of portal hypertension.  Follow Up 3/13/23: Patient presents for routine post hospitalization follow up.   Patient presented to [facility] 2/21/23 with hematemesis that started 2/20/23 around 11pm. She also reported dark stools that occurred a few days ago. Per documentation, patient was prescribed Naproxen for elbow pain a week prior that she has been taking twice daily. Patient was admitted and started on PPI and octreotide drip. She was transferred to [facility] for TIPS evaluation. Upon arrival, patient receiving 2nd unit of PRBCs that was ordered prior to transfer. EGD showed bleeding gastric varices.  2/22 patient underwent TIPS. S/p 2 units PRBC. She had embolization of coronary veins.  She was started on Bumex 1 mg bid, Aldactone 100 qd Lactulose 15 cc QD. She was also started on Insulin. She feels better today.

## 2023-03-14 LAB
A/G RATIO: 1.9
AFP, SERUM, TUMOR MARKER: 2.4
ALBUMIN: 4.2
ALKALINE PHOSPHATASE: 128
ALT (SGPT): 29
AST (SGOT): 44
BASO (ABSOLUTE): 0
BASOS: 1
BILIRUBIN, TOTAL: 5.4
BUN/CREATININE RATIO: 21
BUN: 23
CALCIUM: 10.3
CARBON DIOXIDE, TOTAL: 24
CHLORIDE: 95
CREATININE: 1.11
EGFR: 56
EOS (ABSOLUTE): 0.1
EOS: 2
GLOBULIN, TOTAL: 2.2
GLUCOSE: 311
HEMATOCRIT: 29.6
HEMATOLOGY COMMENTS:: (no result)
HEMOGLOBIN: 9.6
IMMATURE CELLS: (no result)
IMMATURE GRANS (ABS): 0
IMMATURE GRANULOCYTES: 0
INR: 1.4
LYMPHS (ABSOLUTE): 0.5
LYMPHS: 24
MCH: 27.7
MCHC: 32.4
MCV: 85
MONOCYTES(ABSOLUTE): 0.3
MONOCYTES: 15
NEUTROPHILS (ABSOLUTE): 1.3
NEUTROPHILS: 58
NRBC: (no result)
PLATELETS: 77
POTASSIUM: 3.5
PROTEIN, TOTAL: 6.4
PROTHROMBIN TIME: 13.9
RBC: 3.47
RDW: 18.5
SODIUM: 135
WBC: 2.2

## 2023-03-15 ENCOUNTER — TELEPHONE ENCOUNTER (OUTPATIENT)
Dept: URBAN - METROPOLITAN AREA CLINIC 35 | Facility: CLINIC | Age: 63
End: 2023-03-15

## 2023-03-15 ENCOUNTER — TELEPHONE ENCOUNTER (OUTPATIENT)
Dept: URBAN - METROPOLITAN AREA CLINIC 54 | Facility: CLINIC | Age: 63
End: 2023-03-15

## 2023-04-13 ENCOUNTER — OFFICE VISIT (OUTPATIENT)
Dept: URBAN - METROPOLITAN AREA SURGERY CENTER 14 | Facility: SURGERY CENTER | Age: 63
End: 2023-04-13
Payer: COMMERCIAL

## 2023-04-13 DIAGNOSIS — Z86.010 ADENOMAS PERSONAL HISTORY OF COLONIC POLYPS: ICD-10-CM

## 2023-04-13 PROCEDURE — 45378 DIAGNOSTIC COLONOSCOPY: CPT | Performed by: INTERNAL MEDICINE

## 2023-04-13 PROCEDURE — G8907 PT DOC NO EVENTS ON DISCHARG: HCPCS | Performed by: INTERNAL MEDICINE

## 2023-04-13 RX ORDER — PANTOPRAZOLE SODIUM 40 MG/1
1 TABLET TABLET, DELAYED RELEASE ORAL ONCE A DAY
Status: ACTIVE | COMMUNITY

## 2023-04-13 RX ORDER — INSULIN LISPRO 100 U/ML
AS DIRECTED INJECTION, SOLUTION INTRAVENOUS; SUBCUTANEOUS
Status: ACTIVE | COMMUNITY

## 2023-04-13 RX ORDER — SPIRONOLACTONE 100 MG/1
1 TABLET TABLET, FILM COATED ORAL ONCE A DAY
Qty: 30 | Refills: 3 | Status: ACTIVE | COMMUNITY
Start: 2023-03-13 | End: 2023-07-11

## 2023-04-13 RX ORDER — LORATADINE 10 MG
1 PACKET MIXED WITH 8 OUNCES OF FLUID TABLET,DISINTEGRATING ORAL ONCE A DAY
Status: ACTIVE | COMMUNITY

## 2023-04-13 RX ORDER — NADOLOL 20 MG/1
TAKE 2 TABLETS BY MOUTH EVERY DAY TABLET ORAL
Qty: 180 TABLET | Refills: 0 | Status: ACTIVE | COMMUNITY

## 2023-04-13 RX ORDER — BUMETANIDE 1 MG/1
1 TABLET TABLET ORAL ONCE A DAY
Qty: 30 | Refills: 3 | Status: ACTIVE | COMMUNITY
Start: 2023-03-13

## 2023-04-13 RX ORDER — SPIRONOLACTONE 50 MG/1
1 TABLET TABLET, FILM COATED ORAL
Status: ACTIVE | COMMUNITY

## 2023-04-13 RX ORDER — INSULIN GLARGINE 100 [IU]/ML
AS DIRECTED INJECTION, SOLUTION SUBCUTANEOUS
Status: ACTIVE | COMMUNITY

## 2023-04-13 RX ORDER — CIPROFLOXACIN HYDROCHLORIDE 500 MG/1
1 TABLET TABLET, FILM COATED ORAL
Status: ACTIVE | COMMUNITY

## 2023-04-13 RX ORDER — BUMETANIDE 0.5 MG/1
1 TABLET TABLET ORAL ONCE A DAY
Status: ACTIVE | COMMUNITY

## 2023-04-21 ENCOUNTER — TELEPHONE ENCOUNTER (OUTPATIENT)
Dept: URBAN - NONMETROPOLITAN AREA CLINIC 4 | Facility: CLINIC | Age: 63
End: 2023-04-21

## 2023-04-21 RX ORDER — SPIRONOLACTONE 100 MG/1
1 TABLET TABLET, FILM COATED ORAL ONCE A DAY
Qty: 30 | Refills: 3

## 2023-05-12 ENCOUNTER — OFFICE VISIT (OUTPATIENT)
Dept: URBAN - METROPOLITAN AREA CLINIC 54 | Facility: CLINIC | Age: 63
End: 2023-05-12
Payer: COMMERCIAL

## 2023-05-12 VITALS
DIASTOLIC BLOOD PRESSURE: 64 MMHG | HEART RATE: 64 BPM | BODY MASS INDEX: 31.41 KG/M2 | TEMPERATURE: 97.1 F | SYSTOLIC BLOOD PRESSURE: 144 MMHG | HEIGHT: 64 IN | WEIGHT: 184 LBS

## 2023-05-12 DIAGNOSIS — D73.1 HYPERSPLENISM: ICD-10-CM

## 2023-05-12 DIAGNOSIS — E66.9 OBESITY: ICD-10-CM

## 2023-05-12 DIAGNOSIS — Z12.11 ENCOUNTER FOR SCREENING COLONOSCOPY: ICD-10-CM

## 2023-05-12 DIAGNOSIS — K76.0 NAFLD (NONALCOHOLIC FATTY LIVER DISEASE): ICD-10-CM

## 2023-05-12 DIAGNOSIS — I86.4 GASTRIC VARICES: ICD-10-CM

## 2023-05-12 DIAGNOSIS — K76.6 PORTAL HYPERTENSION: ICD-10-CM

## 2023-05-12 DIAGNOSIS — K74.60 CIRRHOSIS OF LIVER: ICD-10-CM

## 2023-05-12 DIAGNOSIS — D69.1 THROMBOCYTOPENIA: ICD-10-CM

## 2023-05-12 DIAGNOSIS — I85.00 ESOPHAGEAL VARICES DETERMINED BY ENDOSCOPY: ICD-10-CM

## 2023-05-12 DIAGNOSIS — K44.9 HIATAL HERNIA: ICD-10-CM

## 2023-05-12 DIAGNOSIS — K31.89 OTHER DISEASES OF STOMACH AND DUODENUM: ICD-10-CM

## 2023-05-12 PROCEDURE — 99214 OFFICE O/P EST MOD 30 MIN: CPT | Performed by: INTERNAL MEDICINE

## 2023-05-12 RX ORDER — LORATADINE 10 MG
1 PACKET MIXED WITH 8 OUNCES OF FLUID TABLET,DISINTEGRATING ORAL ONCE A DAY
Status: ACTIVE | COMMUNITY

## 2023-05-12 RX ORDER — INSULIN LISPRO 100 U/ML
AS DIRECTED INJECTION, SOLUTION INTRAVENOUS; SUBCUTANEOUS
Status: ACTIVE | COMMUNITY

## 2023-05-12 RX ORDER — INSULIN GLARGINE 100 [IU]/ML
AS DIRECTED INJECTION, SOLUTION SUBCUTANEOUS
Status: ACTIVE | COMMUNITY

## 2023-05-12 RX ORDER — PANTOPRAZOLE SODIUM 40 MG/1
1 TABLET TABLET, DELAYED RELEASE ORAL ONCE A DAY
Status: ACTIVE | COMMUNITY

## 2023-05-12 RX ORDER — NADOLOL 20 MG/1
TAKE 2 TABLETS BY MOUTH EVERY DAY TABLET ORAL
Qty: 180 TABLET | Refills: 0 | Status: ACTIVE | COMMUNITY

## 2023-05-12 NOTE — HPI-TODAY'S VISIT:
The patient is a 57 year old /White female , who presents on referral from Dr. Jarret Stanley and Brant Grigsby for a gastroenterology evaluation for above issues. Patient was referred to hematology for low WBC counts in Aug 2017. She was noted to be pancytopenic and had a negative workup including BM biopsy.   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 She carries a diagnosis of fatty liver for several years. Risk factors include DM, HTN, obesity. No use of steatogenic medications. No use use of alcohol or herbal supplements. No risk factors for acquisition of viral hepatitis. CT showed ? D1/2 lesions but patient reports having a negative EGD by Dr. Carrasco. Recent labs show WBC 2.1, Hb 11.9, PLT 49. Last colonoscopy was 3 years ago when 2-3 polyps were removed. No signs/symptoms of advanced liver disease. She c/o fatigue and mild ankle swelling.    Follow Up 7/13/2018: Patient presents for routine follow up. She has lost 16 lbs with diet and exercise. No signs/symptoms of advanced liver disease. She was recently given a short course of prednisone for ? reasons. Secondary liver workup showed + AMA and ASMA. No new complaints. She was diagnosed with osteoporosis and is on Ca and Vit D.  Follow Up 1/17/2019: Patient presents for routine follow up. MELD was 7 in April 2018. RUQ USG showd a 2.7 cm intra-abdominal lesion which was not seen on contrast enhanced CT. No liver lesions. She has no complaints and has done well over past 6 months. No signs of progression of liver disese. She reports good diabetic control with last HBA1C was 6%. She has gained 10 lbs.  Follow Up 7/18/2019: Patient presents for routine follow up. She was seen by hematology for leukopenia and low PLT's. No liver related complaints. She cannot lose weight. She is on low carb diet. MELD remains low.  Follow Up 1/13/2020: Patient presents for routine follow up. No liver related complaints. She reports good diabetic control. No signs of progression of liver disease. MRI in Aug 2019 negative for HCC. Weight remains the same at around 200 lbs. She has been seeing Celia Serrano.   Follow Up 7/30/20: Patient presents for routine follow up. No liver related complaints or hospitalizations over past 6 months. No change in weight noted. MELD is 6. No signs of portal hypertension. RUQ USG in Feb 2020 negative for HCC.  Follow Up 1/28/21: Patient presents for routine follow up. She had mild Covid infection in Dec 2020. No liver related complaints. Colon in Aug 2020 revelaed 5 small polyps (Hyperplastic and TA's), diverticulosis and hemorrhoids. HCC screen negative Aug 2020.  Follow Up 8/30/21: Patient presents for routine follow up. No liver related complaints. HCC screen negative March 2021. No new complaints. No signs of portal hypertension.  Follow Up 3/18/22: Patient presents for routine follow up. No liver related complaints or hospitalizations. Liver function remains stable. USG showed a 1 cm lesion which was not seen on a 4 phase MRI in October 2021. No signs of portal hypertension.  Follow Up 3/13/23: Patient presents for routine post hospitalization follow up.   Patient presented to [facility] 2/21/23 with hematemesis that started 2/20/23 around 11pm. She also reported dark stools that occurred a few days ago. Per documentation, patient was prescribed Naproxen for elbow pain a week prior that she has been taking twice daily. Patient was admitted and started on PPI and octreotide drip. She was transferred to [facility] for TIPS evaluation. Upon arrival, patient receiving 2nd unit of PRBCs that was ordered prior to transfer. EGD showed bleeding gastric varices.  2/22 patient underwent TIPS. S/p 2 units PRBC. She had embolization of coronary veins.  She was started on Bumex 1 mg bid, Aldactone 100 qd Lactulose 15 cc QD. She was also started on Insulin. She feels better today.  Follow Up 5/12/23: Patient presents for routine follow up. She was admitted x 2 in late March for PSE. Cause attributed to noncompliance with lactulose. Her dose was increased to TID scheduling. She is having 5 BM's per day. Her diuretics were stopped due to ? low BP. She remains on Nadolol 20 mg po daily. She was also dishcarged on Midodrine 10 mg po TID. She denies GIB, ascites, volume overload. No obvious signs of PSE.

## 2023-05-15 ENCOUNTER — TELEPHONE ENCOUNTER (OUTPATIENT)
Dept: URBAN - METROPOLITAN AREA CLINIC 54 | Facility: CLINIC | Age: 63
End: 2023-05-15

## 2023-05-15 LAB
A/G RATIO: 1.4
ABSOLUTE BASOPHILS: 19
ABSOLUTE EOSINOPHILS: 48
ABSOLUTE LYMPHOCYTES: 796
ABSOLUTE MONOCYTES: 225
ABSOLUTE NEUTROPHILS: 1012
AFP, SERUM, TUMOR MARKER: 2.9
ALBUMIN: 3.3
ALKALINE PHOSPHATASE: 149
ALT (SGPT): 37
AST (SGOT): 45
BASOPHILS: 0.9
BILIRUBIN, TOTAL: 2.8
BUN/CREATININE RATIO: (no result)
BUN: 17
CALCIUM: 9.3
CARBON DIOXIDE, TOTAL: 27
CHLORIDE: 105
CREATININE: 0.92
EGFR: 70
EOSINOPHILS: 2.3
GLOBULIN, TOTAL: 2.3
GLUCOSE: 416
HEMATOCRIT: 32.8
HEMOGLOBIN: 9.8
INR: 1.3
LYMPHOCYTES: 37.9
MCH: 24
MCHC: 29.9
MCV: 80.4
MONOCYTES: 10.7
MPV: 11.7
NEUTROPHILS: 48.2
PLATELET COUNT: 80
POTASSIUM: 4.6
PROTEIN, TOTAL: 5.6
PT: 13.5
RDW: 15.8
RED BLOOD CELL COUNT: 4.08
SODIUM: 136
WHITE BLOOD CELL COUNT: 2.1

## 2023-06-20 ENCOUNTER — TELEPHONE ENCOUNTER (OUTPATIENT)
Dept: URBAN - METROPOLITAN AREA CLINIC 63 | Facility: CLINIC | Age: 63
End: 2023-06-20

## 2023-07-05 ENCOUNTER — TELEPHONE ENCOUNTER (OUTPATIENT)
Dept: URBAN - METROPOLITAN AREA CLINIC 54 | Facility: CLINIC | Age: 63
End: 2023-07-05

## 2023-07-13 ENCOUNTER — TELEPHONE ENCOUNTER (OUTPATIENT)
Dept: URBAN - METROPOLITAN AREA CLINIC 63 | Facility: CLINIC | Age: 63
End: 2023-07-13

## 2023-09-15 ENCOUNTER — OFFICE VISIT (OUTPATIENT)
Dept: URBAN - METROPOLITAN AREA CLINIC 54 | Facility: CLINIC | Age: 63
End: 2023-09-15

## 2023-11-06 ENCOUNTER — OFFICE VISIT (OUTPATIENT)
Dept: URBAN - METROPOLITAN AREA CLINIC 54 | Facility: CLINIC | Age: 63
End: 2023-11-06
Payer: COMMERCIAL

## 2023-11-06 VITALS
WEIGHT: 214 LBS | SYSTOLIC BLOOD PRESSURE: 126 MMHG | DIASTOLIC BLOOD PRESSURE: 54 MMHG | BODY MASS INDEX: 36.54 KG/M2 | HEART RATE: 66 BPM | HEIGHT: 64 IN | TEMPERATURE: 98.1 F

## 2023-11-06 DIAGNOSIS — K76.82: ICD-10-CM

## 2023-11-06 DIAGNOSIS — K31.89 OTHER DISEASES OF STOMACH AND DUODENUM: ICD-10-CM

## 2023-11-06 DIAGNOSIS — I85.00 ESOPHAGEAL VARICES DETERMINED BY ENDOSCOPY: ICD-10-CM

## 2023-11-06 DIAGNOSIS — K76.0 NAFLD (NONALCOHOLIC FATTY LIVER DISEASE): ICD-10-CM

## 2023-11-06 DIAGNOSIS — D69.1 THROMBOCYTOPENIA: ICD-10-CM

## 2023-11-06 DIAGNOSIS — D73.1 HYPERSPLENISM: ICD-10-CM

## 2023-11-06 DIAGNOSIS — R18.8 OTHER ASCITES: ICD-10-CM

## 2023-11-06 DIAGNOSIS — Z12.11 ENCOUNTER FOR SCREENING COLONOSCOPY: ICD-10-CM

## 2023-11-06 DIAGNOSIS — E66.9 OBESITY: ICD-10-CM

## 2023-11-06 DIAGNOSIS — K76.6 PORTAL HYPERTENSION: ICD-10-CM

## 2023-11-06 DIAGNOSIS — K74.60 CIRRHOSIS OF LIVER: ICD-10-CM

## 2023-11-06 DIAGNOSIS — K44.9 HIATAL HERNIA: ICD-10-CM

## 2023-11-06 DIAGNOSIS — I86.4 GASTRIC VARICES: ICD-10-CM

## 2023-11-06 PROBLEM — 389026000: Status: ACTIVE | Noted: 2023-11-06

## 2023-11-06 PROCEDURE — 99214 OFFICE O/P EST MOD 30 MIN: CPT | Performed by: INTERNAL MEDICINE

## 2023-11-06 RX ORDER — PANTOPRAZOLE SODIUM 40 MG/1
1 TABLET TABLET, DELAYED RELEASE ORAL ONCE A DAY
Status: ACTIVE | COMMUNITY

## 2023-11-06 RX ORDER — LACTULOSE 10 G/15ML
15 ML AS NEEDED SOLUTION ORAL ONCE A DAY
Status: ACTIVE | COMMUNITY

## 2023-11-06 RX ORDER — INSULIN GLARGINE 100 [IU]/ML
AS DIRECTED INJECTION, SOLUTION SUBCUTANEOUS
Status: ACTIVE | COMMUNITY

## 2023-11-06 RX ORDER — HYDROXYZINE HYDROCHLORIDE 25 MG/1
1 TABLET AS NEEDED TABLET, FILM COATED ORAL ONCE A DAY
Status: ACTIVE | COMMUNITY

## 2023-11-06 RX ORDER — LEVOTHYROXINE SODIUM 25 UG/1
1 TABLET IN THE MORNING ON AN EMPTY STOMACH TABLET ORAL ONCE A DAY
Status: ACTIVE | COMMUNITY

## 2023-11-06 RX ORDER — NADOLOL 20 MG/1
TAKE 2 TABLETS BY MOUTH EVERY DAY TABLET ORAL
Qty: 180 TABLET | Refills: 0 | Status: ACTIVE | COMMUNITY

## 2023-11-06 RX ORDER — LORATADINE 10 MG
1 PACKET MIXED WITH 8 OUNCES OF FLUID TABLET,DISINTEGRATING ORAL ONCE A DAY
Status: ACTIVE | COMMUNITY

## 2023-11-06 RX ORDER — FERROUS GLUCONATE 324 MG
1 TABLET TABLET ORAL
Status: ACTIVE | COMMUNITY

## 2023-11-06 RX ORDER — INSULIN LISPRO 100 U/ML
AS DIRECTED INJECTION, SOLUTION INTRAVENOUS; SUBCUTANEOUS
Status: ACTIVE | COMMUNITY

## 2023-11-06 RX ORDER — MIDODRINE HYDROCHLORIDE 10 MG/1
1 TABLET TABLET ORAL TWICE A DAY
Status: ACTIVE | COMMUNITY

## 2023-11-06 NOTE — HPI-TODAY'S VISIT:
The patient is a 57 year old /White female , who presents on referral from Dr. Jarret Stanley and Brant Grigsby for a gastroenterology evaluation for above issues. Patient was referred to hematology for low WBC counts in Aug 2017. She was noted to be pancytopenic and had a negative workup including BM biopsy.   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 She carries a diagnosis of fatty liver for several years. Risk factors include DM, HTN, obesity. No use of steatogenic medications. No use use of alcohol or herbal supplements. No risk factors for acquisition of viral hepatitis. CT showed ? D1/2 lesions but patient reports having a negative EGD by Dr. Carrasco. Recent labs show WBC 2.1, Hb 11.9, PLT 49. Last colonoscopy was 3 years ago when 2-3 polyps were removed. No signs/symptoms of advanced liver disease. She c/o fatigue and mild ankle swelling.    Follow Up 7/13/2018: Patient presents for routine follow up. She has lost 16 lbs with diet and exercise. No signs/symptoms of advanced liver disease. She was recently given a short course of prednisone for ? reasons. Secondary liver workup showed + AMA and ASMA. No new complaints. She was diagnosed with osteoporosis and is on Ca and Vit D.  Follow Up 1/17/2019: Patient presents for routine follow up. MELD was 7 in April 2018. RUQ USG showd a 2.7 cm intra-abdominal lesion which was not seen on contrast enhanced CT. No liver lesions. She has no complaints and has done well over past 6 months. No signs of progression of liver disese. She reports good diabetic control with last HBA1C was 6%. She has gained 10 lbs.  Follow Up 7/18/2019: Patient presents for routine follow up. She was seen by hematology for leukopenia and low PLT's. No liver related complaints. She cannot lose weight. She is on low carb diet. MELD remains low.  Follow Up 1/13/2020: Patient presents for routine follow up. No liver related complaints. She reports good diabetic control. No signs of progression of liver disease. MRI in Aug 2019 negative for HCC. Weight remains the same at around 200 lbs. She has been seeing Celia Serrano.   Follow Up 7/30/20: Patient presents for routine follow up. No liver related complaints or hospitalizations over past 6 months. No change in weight noted. MELD is 6. No signs of portal hypertension. RUQ USG in Feb 2020 negative for HCC.  Follow Up 1/28/21: Patient presents for routine follow up. She had mild Covid infection in Dec 2020. No liver related complaints. Colon in Aug 2020 revelaed 5 small polyps (Hyperplastic and TA's), diverticulosis and hemorrhoids. HCC screen negative Aug 2020.  Follow Up 8/30/21: Patient presents for routine follow up. No liver related complaints. HCC screen negative March 2021. No new complaints. No signs of portal hypertension.  Follow Up 3/18/22: Patient presents for routine follow up. No liver related complaints or hospitalizations. Liver function remains stable. USG showed a 1 cm lesion which was not seen on a 4 phase MRI in October 2021. No signs of portal hypertension.  Follow Up 3/13/23: Patient presents for routine post hospitalization follow up.   Patient presented to [facility] 2/21/23 with hematemesis that started 2/20/23 around 11pm. She also reported dark stools that occurred a few days ago. Per documentation, patient was prescribed Naproxen for elbow pain a week prior that she has been taking twice daily. Patient was admitted and started on PPI and octreotide drip. She was transferred to [facility] for TIPS evaluation. Upon arrival, patient receiving 2nd unit of PRBCs that was ordered prior to transfer. EGD showed bleeding gastric varices.  2/22 patient underwent TIPS. S/p 2 units PRBC. She had embolization of coronary veins.  She was started on Bumex 1 mg bid, Aldactone 100 qd Lactulose 15 cc QD. She was also started on Insulin. She feels better today.  Follow Up 5/12/23: Patient presents for routine follow up. She was admitted x 2 in late March for PSE. Cause attributed to noncompliance with lactulose. Her dose was increased to TID scheduling. She is having 5 BM's per day. Her diuretics were stopped due to ? low BP. She remains on Nadolol 20 mg po daily. She was also dishcarged on Midodrine 10 mg po TID. She denies GIB, ascites, volume overload. No obvious signs of PSE.  Follow Up 11/6/23: Patient presents for routine follow up. She underwent IPE at [place] and is completing listing requirements. She was admitted at [facility] x 2 days for PSE 2 weeks ago. She is on Lactulose 45 cc BID, going about 4-5 times per day. She is not on Rifaximin 550 mg po BID due to cost. She is on Midodrine 10 bid. She is euvolemic on A100 and compliant with Nadolol 40 mg daily.

## 2023-11-07 LAB
A/G RATIO: 1.7
AFP, SERUM, TUMOR MARKER: 2.8
ALBUMIN: 3.4
ALKALINE PHOSPHATASE: 136
ALT (SGPT): 22
AST (SGOT): 38
BASO (ABSOLUTE): 0
BASOS: 0
BILIRUBIN, TOTAL: 2.1
BUN/CREATININE RATIO: 24
BUN: 20
CALCIUM: 9.2
CARBON DIOXIDE, TOTAL: 23
CHLORIDE: 107
CREATININE: 0.82
EGFR: 80
EOS (ABSOLUTE): 0.1
EOS: 3
GLOBULIN, TOTAL: 2
GLUCOSE: 187
HEMATOCRIT: 36.1
HEMATOLOGY COMMENTS:: (no result)
HEMOGLOBIN: 12
IMMATURE CELLS: (no result)
IMMATURE GRANS (ABS): 0
IMMATURE GRANULOCYTES: 0
INR: 1.2
LYMPHS (ABSOLUTE): 0.9
LYMPHS: 32
MCH: 30.2
MCHC: 33.2
MCV: 91
MONOCYTES(ABSOLUTE): 0.3
MONOCYTES: 11
NEUTROPHILS (ABSOLUTE): 1.5
NEUTROPHILS: 54
NRBC: (no result)
PLATELETS: 68
POTASSIUM: 4.4
PROTEIN, TOTAL: 5.4
PROTHROMBIN TIME: 12.8
RBC: 3.97
RDW: 16.8
SODIUM: 141
WBC: 2.8

## 2024-04-15 ENCOUNTER — OV EP (OUTPATIENT)
Dept: URBAN - METROPOLITAN AREA CLINIC 54 | Facility: CLINIC | Age: 64
End: 2024-04-15
Payer: COMMERCIAL

## 2024-04-15 VITALS
WEIGHT: 214 LBS | DIASTOLIC BLOOD PRESSURE: 63 MMHG | SYSTOLIC BLOOD PRESSURE: 134 MMHG | TEMPERATURE: 97.3 F | HEART RATE: 69 BPM | BODY MASS INDEX: 36.54 KG/M2 | HEIGHT: 64 IN

## 2024-04-15 DIAGNOSIS — K74.60 CIRRHOSIS OF LIVER: ICD-10-CM

## 2024-04-15 DIAGNOSIS — E66.9 OBESITY: ICD-10-CM

## 2024-04-15 DIAGNOSIS — I85.00 ESOPHAGEAL VARICES DETERMINED BY ENDOSCOPY: ICD-10-CM

## 2024-04-15 DIAGNOSIS — D73.1 HYPERSPLENISM: ICD-10-CM

## 2024-04-15 DIAGNOSIS — I86.4 GASTRIC VARICES: ICD-10-CM

## 2024-04-15 DIAGNOSIS — K31.89 OTHER DISEASES OF STOMACH AND DUODENUM: ICD-10-CM

## 2024-04-15 DIAGNOSIS — D69.1 THROMBOCYTOPENIA: ICD-10-CM

## 2024-04-15 DIAGNOSIS — R18.8 OTHER ASCITES: ICD-10-CM

## 2024-04-15 DIAGNOSIS — K44.9 HIATAL HERNIA: ICD-10-CM

## 2024-04-15 DIAGNOSIS — Z12.11 ENCOUNTER FOR SCREENING COLONOSCOPY: ICD-10-CM

## 2024-04-15 DIAGNOSIS — K76.0 NAFLD (NONALCOHOLIC FATTY LIVER DISEASE): ICD-10-CM

## 2024-04-15 DIAGNOSIS — K76.82: ICD-10-CM

## 2024-04-15 DIAGNOSIS — K76.6 PORTAL HYPERTENSION: ICD-10-CM

## 2024-04-15 PROCEDURE — 99214 OFFICE O/P EST MOD 30 MIN: CPT | Performed by: INTERNAL MEDICINE

## 2024-04-15 RX ORDER — SEMAGLUTIDE 0.68 MG/ML
AS DIRECTED INJECTION, SOLUTION SUBCUTANEOUS
Status: ACTIVE | COMMUNITY

## 2024-04-15 RX ORDER — INSULIN GLARGINE 100 [IU]/ML
AS DIRECTED INJECTION, SOLUTION SUBCUTANEOUS
Status: ACTIVE | COMMUNITY

## 2024-04-15 RX ORDER — NADOLOL 20 MG/1
TAKE 2 TABLETS BY MOUTH EVERY DAY TABLET ORAL
Qty: 180 TABLET | Refills: 0 | Status: ACTIVE | COMMUNITY

## 2024-04-15 RX ORDER — FERROUS GLUCONATE 324 MG
1 TABLET TABLET ORAL
Status: ACTIVE | COMMUNITY

## 2024-04-15 RX ORDER — MIDODRINE HYDROCHLORIDE 10 MG/1
1 TABLET TABLET ORAL TWICE A DAY
Status: ACTIVE | COMMUNITY

## 2024-04-15 RX ORDER — SPIRONOLACTONE 100 MG/1
1 TABLET TABLET, FILM COATED ORAL ONCE A DAY
Status: ACTIVE | COMMUNITY

## 2024-04-15 RX ORDER — LEVOTHYROXINE SODIUM 25 UG/1
1 TABLET IN THE MORNING ON AN EMPTY STOMACH TABLET ORAL ONCE A DAY
Status: ACTIVE | COMMUNITY

## 2024-04-15 RX ORDER — PANTOPRAZOLE SODIUM 40 MG/1
1 TABLET TABLET, DELAYED RELEASE ORAL ONCE A DAY
Status: ACTIVE | COMMUNITY

## 2024-04-15 RX ORDER — LACTULOSE 10 G/15ML
15 ML AS NEEDED SOLUTION ORAL ONCE A DAY
Status: ACTIVE | COMMUNITY

## 2024-04-15 RX ORDER — INSULIN LISPRO 100 U/ML
15 UNITS INJECTION, SOLUTION INTRAVENOUS; SUBCUTANEOUS
Status: ACTIVE | COMMUNITY

## 2024-04-15 NOTE — PHYSICAL EXAM CARDIOVASCULAR:
Patient arrive ambulatory per self for labs and MD visit. Denies complaint or concern. Vitals as charted. Port accessed; specimen sent. Physician met with patient; labs reviewed; ok to discharge. Port flushed and heparinized with intact morillo needle removed per protocol. Patient ambulate off unit per self at discharge. no edema, no murmurs, regular rate and rhythm

## 2024-04-15 NOTE — HPI-TODAY'S VISIT:
The patient is a 57 year old /White female , who presents on referral from Dr. Jarret Stanley and Brant Grigsby for a gastroenterology evaluation for above issues. Patient was referred to hematology for low WBC counts in Aug 2017. She was noted to be pancytopenic and had a negative workup including BM biopsy.   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 She carries a diagnosis of fatty liver for several years. Risk factors include DM, HTN, obesity. No use of steatogenic medications. No use use of alcohol or herbal supplements. No risk factors for acquisition of viral hepatitis. CT showed ? D1/2 lesions but patient reports having a negative EGD by Dr. Carrasco. Recent labs show WBC 2.1, Hb 11.9, PLT 49. Last colonoscopy was 3 years ago when 2-3 polyps were removed. No signs/symptoms of advanced liver disease. She c/o fatigue and mild ankle swelling.    Follow Up 7/13/2018: Patient presents for routine follow up. She has lost 16 lbs with diet and exercise. No signs/symptoms of advanced liver disease. She was recently given a short course of prednisone for ? reasons. Secondary liver workup showed + AMA and ASMA. No new complaints. She was diagnosed with osteoporosis and is on Ca and Vit D.  Follow Up 1/17/2019: Patient presents for routine follow up. MELD was 7 in April 2018. RUQ USG showd a 2.7 cm intra-abdominal lesion which was not seen on contrast enhanced CT. No liver lesions. She has no complaints and has done well over past 6 months. No signs of progression of liver disese. She reports good diabetic control with last HBA1C was 6%. She has gained 10 lbs.  Follow Up 7/18/2019: Patient presents for routine follow up. She was seen by hematology for leukopenia and low PLT's. No liver related complaints. She cannot lose weight. She is on low carb diet. MELD remains low.  Follow Up 1/13/2020: Patient presents for routine follow up. No liver related complaints. She reports good diabetic control. No signs of progression of liver disease. MRI in Aug 2019 negative for HCC. Weight remains the same at around 200 lbs. She has been seeing Celia Serrano.   Follow Up 7/30/20: Patient presents for routine follow up. No liver related complaints or hospitalizations over past 6 months. No change in weight noted. MELD is 6. No signs of portal hypertension. RUQ USG in Feb 2020 negative for HCC.  Follow Up 1/28/21: Patient presents for routine follow up. She had mild Covid infection in Dec 2020. No liver related complaints. Colon in Aug 2020 revelaed 5 small polyps (Hyperplastic and TA's), diverticulosis and hemorrhoids. HCC screen negative Aug 2020.  Follow Up 8/30/21: Patient presents for routine follow up. No liver related complaints. HCC screen negative March 2021. No new complaints. No signs of portal hypertension.  Follow Up 3/18/22: Patient presents for routine follow up. No liver related complaints or hospitalizations. Liver function remains stable. USG showed a 1 cm lesion which was not seen on a 4 phase MRI in October 2021. No signs of portal hypertension.  Follow Up 3/13/23: Patient presents for routine post hospitalization follow up.   Patient presented to [facility] 2/21/23 with hematemesis that started 2/20/23 around 11pm. She also reported dark stools that occurred a few days ago. Per documentation, patient was prescribed Naproxen for elbow pain a week prior that she has been taking twice daily. Patient was admitted and started on PPI and octreotide drip. She was transferred to [facility] for TIPS evaluation. Upon arrival, patient receiving 2nd unit of PRBCs that was ordered prior to transfer. EGD showed bleeding gastric varices.  2/22 patient underwent TIPS. S/p 2 units PRBC. She had embolization of coronary veins.  She was started on Bumex 1 mg bid, Aldactone 100 qd Lactulose 15 cc QD. She was also started on Insulin. She feels better today.  Follow Up 5/12/23: Patient presents for routine follow up. She was admitted x 2 in late March for PSE. Cause attributed to noncompliance with lactulose. Her dose was increased to TID scheduling. She is having 5 BM's per day. Her diuretics were stopped due to ? low BP. She remains on Nadolol 20 mg po daily. She was also dishcarged on Midodrine 10 mg po TID. She denies GIB, ascites, volume overload. No obvious signs of PSE.  Follow Up 11/6/23: Patient presents for routine follow up. She underwent IPE at [place] and is completing listing requirements. She was admitted at [facility] x 2 days for PSE 2 weeks ago. She is on Lactulose 45 cc BID, going about 4-5 times per day. She is not on Rifaximin 550 mg po BID due to cost. She is on Midodrine 10 bid. She is euvolemic on A100 and compliant with Nadolol 40 mg daily.  Follow Up 4/15/24: Patient presents for routine follow up. She is listed for OLT. No changes to her medications. She has been started on Ozempic 0.25 mg SQ weekly 3 weeks ago. She is now under care of endocrinology. She was stared on L40 during her visit to [place] a month ago. She has lost 14 lbs and feels better. She remains on A100.

## 2024-06-10 ENCOUNTER — DASHBOARD ENCOUNTERS (OUTPATIENT)
Age: 64
End: 2024-06-10

## 2024-06-24 ENCOUNTER — ERX REFILL RESPONSE (OUTPATIENT)
Dept: URBAN - METROPOLITAN AREA CLINIC 54 | Facility: CLINIC | Age: 64
End: 2024-06-24

## 2024-06-24 RX ORDER — NADOLOL 20 MG/1
TAKE 2 TABLETS BY MOUTH EVERY DAY TABLET ORAL
Qty: 180 TABLET | Refills: 0 | OUTPATIENT

## 2024-07-02 ENCOUNTER — ERX REFILL RESPONSE (OUTPATIENT)
Dept: URBAN - METROPOLITAN AREA CLINIC 54 | Facility: CLINIC | Age: 64
End: 2024-07-02

## 2024-07-02 RX ORDER — FUROSEMIDE 20 MG/1
TAKE 1 TABLET BY MOUTH DAILY TABLET ORAL
Qty: 30 TABLET | Refills: 0 | OUTPATIENT

## 2024-10-17 ENCOUNTER — TELEPHONE ENCOUNTER (OUTPATIENT)
Dept: URBAN - METROPOLITAN AREA CLINIC 54 | Facility: CLINIC | Age: 64
End: 2024-10-17

## 2024-10-17 ENCOUNTER — OFFICE VISIT (OUTPATIENT)
Dept: RURAL CLINIC 6 | Facility: CLINIC | Age: 64
End: 2024-10-17

## 2024-10-17 ENCOUNTER — LAB OUTSIDE AN ENCOUNTER (OUTPATIENT)
Dept: URBAN - METROPOLITAN AREA CLINIC 54 | Facility: CLINIC | Age: 64
End: 2024-10-17

## 2024-10-17 VITALS
BODY MASS INDEX: 35.78 KG/M2 | HEIGHT: 64 IN | TEMPERATURE: 98 F | SYSTOLIC BLOOD PRESSURE: 135 MMHG | WEIGHT: 209.6 LBS | HEART RATE: 72 BPM | DIASTOLIC BLOOD PRESSURE: 76 MMHG

## 2024-10-17 RX ORDER — FUROSEMIDE 20 MG/1
TAKE 1 TABLET BY MOUTH DAILY TABLET ORAL
Qty: 30 TABLET | Refills: 0 | Status: ACTIVE | COMMUNITY

## 2024-10-17 RX ORDER — INSULIN LISPRO 100 U/ML
15 UNITS INJECTION, SOLUTION INTRAVENOUS; SUBCUTANEOUS
Status: ACTIVE | COMMUNITY

## 2024-10-17 RX ORDER — LEVOTHYROXINE SODIUM 25 UG/1
1 TABLET IN THE MORNING ON AN EMPTY STOMACH TABLET ORAL ONCE A DAY
Status: ACTIVE | COMMUNITY

## 2024-10-17 RX ORDER — FERROUS GLUCONATE 324 MG
1 TABLET TABLET ORAL
COMMUNITY

## 2024-10-17 RX ORDER — PANTOPRAZOLE SODIUM 40 MG/1
1 TABLET TABLET, DELAYED RELEASE ORAL ONCE A DAY
Status: ACTIVE | COMMUNITY

## 2024-10-17 RX ORDER — MIDODRINE HYDROCHLORIDE 10 MG/1
1 TABLET TABLET ORAL TWICE A DAY
COMMUNITY

## 2024-10-17 RX ORDER — SEMAGLUTIDE 0.68 MG/ML
AS DIRECTED INJECTION, SOLUTION SUBCUTANEOUS
COMMUNITY

## 2024-10-17 RX ORDER — INSULIN GLARGINE 100 [IU]/ML
AS DIRECTED INJECTION, SOLUTION SUBCUTANEOUS
COMMUNITY

## 2024-10-17 RX ORDER — INSULIN LISPRO 100 U/ML
15 UNITS INJECTION, SOLUTION INTRAVENOUS; SUBCUTANEOUS
COMMUNITY

## 2024-10-17 RX ORDER — NADOLOL 20 MG/1
TAKE 2 TABLETS BY MOUTH EVERY DAY TABLET ORAL
Qty: 180 TABLET | Refills: 0 | COMMUNITY

## 2024-10-17 RX ORDER — LACTULOSE 10 G/15ML
15 ML AS NEEDED SOLUTION ORAL ONCE A DAY
Status: ACTIVE | COMMUNITY

## 2024-10-17 RX ORDER — PANTOPRAZOLE SODIUM 40 MG/1
1 TABLET TABLET, DELAYED RELEASE ORAL ONCE A DAY
COMMUNITY

## 2024-10-17 RX ORDER — NADOLOL 20 MG/1
TAKE 2 TABLETS BY MOUTH EVERY DAY TABLET ORAL
Qty: 180 TABLET | Refills: 0 | Status: ACTIVE | COMMUNITY

## 2024-10-17 RX ORDER — MIDODRINE HYDROCHLORIDE 10 MG/1
1 TABLET TABLET ORAL TWICE A DAY
Status: ACTIVE | COMMUNITY

## 2024-10-17 RX ORDER — SPIRONOLACTONE 100 MG/1
1 TABLET TABLET, FILM COATED ORAL ONCE A DAY
COMMUNITY

## 2024-10-17 RX ORDER — SPIRONOLACTONE 100 MG/1
1 TABLET TABLET, FILM COATED ORAL ONCE A DAY
Status: ACTIVE | COMMUNITY

## 2024-10-17 RX ORDER — LEVOTHYROXINE SODIUM 25 UG/1
1 TABLET IN THE MORNING ON AN EMPTY STOMACH TABLET ORAL ONCE A DAY
COMMUNITY

## 2024-10-17 RX ORDER — INSULIN GLARGINE 100 [IU]/ML
AS DIRECTED INJECTION, SOLUTION SUBCUTANEOUS
Status: ACTIVE | COMMUNITY

## 2024-10-17 RX ORDER — LACTULOSE 10 G/15ML
15 ML AS NEEDED SOLUTION ORAL ONCE A DAY
COMMUNITY

## 2024-10-17 RX ORDER — FERROUS GLUCONATE 324 MG
1 TABLET TABLET ORAL
Status: ACTIVE | COMMUNITY

## 2024-10-17 RX ORDER — SEMAGLUTIDE 0.68 MG/ML
AS DIRECTED INJECTION, SOLUTION SUBCUTANEOUS
Status: ACTIVE | COMMUNITY

## 2024-10-17 NOTE — HPI-TODAY'S VISIT:
The patient is a 57 year old /White female, who presents on referral from Dr. Jarret Stanley and Brant Grigsby for a gastroenterology evaluation for above issues. Patient was referred to hematology for low WBC counts in Aug 2017. She was noted to be pancytopenic and had a negative workup including BM biopsy.   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 She carries a diagnosis of fatty liver for several years. Risk factors include DM, HTN, obesity. No use of steatogenic medications. No use use of alcohol or herbal supplements. No risk factors for acquisition of viral hepatitis. CT showed ? D1/2 lesions but patient reports having a negative EGD by Dr. Carrasco. Recent labs show WBC 2.1, Hb 11.9, PLT 49. Last colonoscopy was 3 years ago when 2-3 polyps were removed. No signs/symptoms of advanced liver disease. She c/o fatigue and mild ankle swelling.    Follow Up 7/13/2018: Patient presents for routine follow up. She has lost 16 lbs with diet and exercise. No signs/symptoms of advanced liver disease. She was recently given a short course of prednisone for ? reasons. Secondary liver workup showed + AMA and ASMA. No new complaints. She was diagnosed with osteoporosis and is on Ca and Vit D.  Follow Up 1/17/2019: Patient presents for routine follow up. MELD was 7 in April 2018. RUQ USG showd a 2.7 cm intra-abdominal lesion which was not seen on contrast enhanced CT. No liver lesions. She has no complaints and has done well over past 6 months. No signs of progression of liver disese. She reports good diabetic control with last HBA1C was 6%. She has gained 10 lbs.  Follow Up 7/18/2019: Patient presents for routine follow up. She was seen by hematology for leukopenia and low PLT's. No liver related complaints. She cannot lose weight. She is on low carb diet. MELD remains low.  Follow Up 1/13/2020: Patient presents for routine follow up. No liver related complaints. She reports good diabetic control. No signs of progression of liver disease. MRI in Aug 2019 negative for HCC. Weight remains the same at around 200 lbs. She has been seeing Celia Serrano.   Follow Up 7/30/20: Patient presents for routine follow up. No liver related complaints or hospitalizations over past 6 months. No change in weight noted. MELD is 6. No signs of portal hypertension. RUQ USG in Feb 2020 negative for HCC.  Follow Up 1/28/21: Patient presents for routine follow up. She had mild Covid infection in Dec 2020. No liver related complaints. Colon in Aug 2020 revelaed 5 small polyps (Hyperplastic and TA's), diverticulosis and hemorrhoids. HCC screen negative Aug 2020.  Follow Up 8/30/21: Patient presents for routine follow up. No liver related complaints. HCC screen negative March 2021. No new complaints. No signs of portal hypertension.  Follow Up 3/18/22: Patient presents for routine follow up. No liver related complaints or hospitalizations. Liver function remains stable. USG showed a 1 cm lesion which was not seen on a 4 phase MRI in October 2021. No signs of portal hypertension.  Follow Up 3/13/23: Patient presents for routine post hospitalization follow up.   Patient presented to [facility] 2/21/23 with hematemesis that started 2/20/23 around 11pm. She also reported dark stools that occurred a few days ago. Per documentation, patient was prescribed Naproxen for elbow pain a week prior that she has been taking twice daily. Patient was admitted and started on PPI and octreotide drip. She was transferred to [facility] for TIPS evaluation. Upon arrival, patient receiving 2nd unit of PRBCs that was ordered prior to transfer. EGD showed bleeding gastric varices.  2/22 patient underwent TIPS. S/p 2 units PRBC. She had embolization of coronary veins.  She was started on Bumex 1 mg bid, Aldactone 100 qd Lactulose 15 cc QD. She was also started on Insulin. She feels better today.  Follow Up 5/12/23: Patient presents for routine follow up. She was admitted x 2 in late March for PSE. Cause attributed to noncompliance with lactulose. Her dose was increased to TID scheduling. She is having 5 BM's per day. Her diuretics were stopped due to ? low BP. She remains on Nadolol 20 mg po daily. She was also dishcarged on Midodrine 10 mg po TID. She denies GIB, ascites, volume overload. No obvious signs of PSE.  Follow Up 11/6/23: Patient presents for routine follow up. She underwent IPE at [place] and is completing listing requirements. She was admitted at [facility] x 2 days for PSE 2 weeks ago. She is on Lactulose 45 cc BID, going about 4-5 times per day. She is not on Rifaximin 550 mg po BID due to cost. She is on Midodrine 10 bid. She is euvolemic on A100 and compliant with Nadolol 40 mg daily.  Follow Up 4/15/24: Patient presents for routine follow up. She is listed for OLT. No changes to her medications. She has been started on Ozempic 0.25 mg SQ weekly 3 weeks ago. She is now under care of endocrinology. She was stared on L40 during her visit to [place] a month ago. She has lost 14 lbs and feels better. She remains on A100.  Follow Up 10/17/24: Pt presents for routine follow up. She remains listed for OLT candidate. She reports increased fatigue. Remains on Lactulose 45 cc BID and Xifaxan 550 BID. She reports 2 BMs daily. Her PCP decreased both A50 L20 due to ?worsening kidney function. On Ozempic 2 mg weekly. Upcoming ECHO and CT scan.

## 2024-10-17 NOTE — HPI-TODAY'S VISIT:
The patient is a 57 year old /White female , who presents on referral from Dr. Jarret Stanley and Brant Grigsby for a gastroenterology evaluation for above issues. Patient was referred to hematology for low WBC counts in Aug 2017. She was noted to be pancytopenic and had a negative workup including BM biopsy.   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 She carries a diagnosis of fatty liver for several years. Risk factors include DM, HTN, obesity. No use of steatogenic medications. No use use of alcohol or herbal supplements. No risk factors for acquisition of viral hepatitis. CT showed ? D1/2 lesions but patient reports having a negative EGD by Dr. Carrasco. Recent labs show WBC 2.1, Hb 11.9, PLT 49. Last colonoscopy was 3 years ago when 2-3 polyps were removed. No signs/symptoms of advanced liver disease. She c/o fatigue and mild ankle swelling.    Follow Up 7/13/2018: Patient presents for routine follow up. She has lost 16 lbs with diet and exercise. No signs/symptoms of advanced liver disease. She was recently given a short course of prednisone for ? reasons. Secondary liver workup showed + AMA and ASMA. No new complaints. She was diagnosed with osteoporosis and is on Ca and Vit D.  Follow Up 1/17/2019: Patient presents for routine follow up. MELD was 7 in April 2018. RUQ USG showd a 2.7 cm intra-abdominal lesion which was not seen on contrast enhanced CT. No liver lesions. She has no complaints and has done well over past 6 months. No signs of progression of liver disese. She reports good diabetic control with last HBA1C was 6%. She has gained 10 lbs.  Follow Up 7/18/2019: Patient presents for routine follow up. She was seen by hematology for leukopenia and low PLT's. No liver related complaints. She cannot lose weight. She is on low carb diet. MELD remains low.  Follow Up 1/13/2020: Patient presents for routine follow up. No liver related complaints. She reports good diabetic control. No signs of progression of liver disease. MRI in Aug 2019 negative for HCC. Weight remains the same at around 200 lbs. She has been seeing Celia Serrano.   Follow Up 7/30/20: Patient presents for routine follow up. No liver related complaints or hospitalizations over past 6 months. No change in weight noted. MELD is 6. No signs of portal hypertension. RUQ USG in Feb 2020 negative for HCC.  Follow Up 1/28/21: Patient presents for routine follow up. She had mild Covid infection in Dec 2020. No liver related complaints. Colon in Aug 2020 revelaed 5 small polyps (Hyperplastic and TA's), diverticulosis and hemorrhoids. HCC screen negative Aug 2020.  Follow Up 8/30/21: Patient presents for routine follow up. No liver related complaints. HCC screen negative March 2021. No new complaints. No signs of portal hypertension.  Follow Up 3/18/22: Patient presents for routine follow up. No liver related complaints or hospitalizations. Liver function remains stable. USG showed a 1 cm lesion which was not seen on a 4 phase MRI in October 2021. No signs of portal hypertension.  Follow Up 3/13/23: Patient presents for routine post hospitalization follow up.   Patient presented to [facility] 2/21/23 with hematemesis that started 2/20/23 around 11pm. She also reported dark stools that occurred a few days ago. Per documentation, patient was prescribed Naproxen for elbow pain a week prior that she has been taking twice daily. Patient was admitted and started on PPI and octreotide drip. She was transferred to [facility] for TIPS evaluation. Upon arrival, patient receiving 2nd unit of PRBCs that was ordered prior to transfer. EGD showed bleeding gastric varices.  2/22 patient underwent TIPS. S/p 2 units PRBC. She had embolization of coronary veins.  She was started on Bumex 1 mg bid, Aldactone 100 qd Lactulose 15 cc QD. She was also started on Insulin. She feels better today.  Follow Up 5/12/23: Patient presents for routine follow up. She was admitted x 2 in late March for PSE. Cause attributed to noncompliance with lactulose. Her dose was increased to TID scheduling. She is having 5 BM's per day. Her diuretics were stopped due to ? low BP. She remains on Nadolol 20 mg po daily. She was also dishcarged on Midodrine 10 mg po TID. She denies GIB, ascites, volume overload. No obvious signs of PSE.  Follow Up 11/6/23: Patient presents for routine follow up. She underwent IPE at [place] and is completing listing requirements. She was admitted at [facility] x 2 days for PSE 2 weeks ago. She is on Lactulose 45 cc BID, going about 4-5 times per day. She is not on Rifaximin 550 mg po BID due to cost. She is on Midodrine 10 bid. She is euvolemic on A100 and compliant with Nadolol 40 mg daily.  Follow Up 4/15/24: Patient presents for routine follow up. She is listed for OLT. No changes to her medications. She has been started on Ozempic 0.25 mg SQ weekly 3 weeks ago. She is now under care of endocrinology. She was stared on L40 during her visit to [place] a month ago. She has lost 14 lbs and feels better. She remains on A100.

## 2024-10-25 LAB
ALBUMIN: 3.2
ALKALINE PHOSPHATASE: 136
ALT (SGPT): 32
AST (SGOT): 56
BASO (ABSOLUTE): 0
BASOS: 0
BILIRUBIN, TOTAL: 3.4
BUN/CREATININE RATIO: 23
BUN: 18
CALCIUM: 9.2
CARBON DIOXIDE, TOTAL: 24
CHLORIDE: 104
CREATININE: 0.78
EGFR: 85
EOS (ABSOLUTE): 0.1
EOS: 2
GLOBULIN, TOTAL: 2.2
GLUCOSE: 239
HEMATOCRIT: 38.3
HEMATOLOGY COMMENTS:: (no result)
HEMOGLOBIN: 13.2
IMMATURE CELLS: (no result)
IMMATURE GRANS (ABS): 0
IMMATURE GRANULOCYTES: 0
INR: 1.2
LYMPHS (ABSOLUTE): 1
LYMPHS: 30
MCH: 34.6
MCHC: 34.5
MCV: 101
MONOCYTES(ABSOLUTE): 0.3
MONOCYTES: 10
NEUTROPHILS (ABSOLUTE): 2
NEUTROPHILS: 58
NRBC: (no result)
PLATELETS: 66
POTASSIUM: 4.7
PROTEIN, TOTAL: 5.4
PROTHROMBIN TIME: 13.4
RBC: 3.81
RDW: 13.5
SODIUM: 138
WBC: 3.3

## 2024-11-04 NOTE — HPI-TODAY'S VISIT:
The patient is a 57 year old /White female , who presents on referral from Dr. Jarret Stanley and Brant Grigsby for a gastroenterology evaluation for above issues. Patient was referred to hematology for low WBC counts in Aug 2017. She was noted to be pancytopenic and had a negative workup including BM biopsy.   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 She carries a diagnosis of fatty liver for several years. Risk factors include DM, HTN, obesity. No use of steatogenic medications. No use use of alcohol or herbal supplements. No risk factors for acquisition of viral hepatitis. CT showed ? D1/2 lesions but patient reports having a negative EGD by Dr. Carrasco. Recent labs show WBC 2.1, Hb 11.9, PLT 49. Last colonoscopy was 3 years ago when 2-3 polyps were removed. No signs/symptoms of advanced liver disease. She c/o fatigue and mild ankle swelling.    Follow Up 7/13/2018: Patient presents for routine follow up. She has lost 16 lbs with diet and exercise. No signs/symptoms of advanced liver disease. She was recently given a short course of prednisone for ? reasons. Secondary liver workup showed + AMA and ASMA. No new complaints. She was diagnosed with osteoporosis and is on Ca and Vit D.  Follow Up 1/17/2019: Patient presents for routine follow up. MELD was 7 in April 2018. RUQ USG showd a 2.7 cm intra-abdominal lesion which was not seen on contrast enhanced CT. No liver lesions. She has no complaints and has done well over past 6 months. No signs of progression of liver disese. She reports good diabetic control with last HBA1C was 6%. She has gained 10 lbs.  Follow Up 7/18/2019: Patient presents for routine follow up. She was seen by hematology for leukopenia and low PLT's. No liver related complaints. She cannot lose weight. She is on low carb diet. MELD remains low.  Follow Up 1/13/2020: Patient presents for routine follow up. No liver related complaints. She reports good diabetic control. No signs of progression of liver disease. MRI in Aug 2019 negative for HCC. Weight remains the same at around 200 lbs. She has been seeing Celia Serrano.   Follow Up 7/30/20: Patient presents for routine follow up. No liver related complaints or hospitalizations over past 6 months. No change in weight noted. MELD is 6. No signs of portal hypertension. RUQ USG in Feb 2020 negative for HCC.  Follow Up 1/28/21: Patient presents for routine follow up. She had mild Covid infection in Dec 2020. No liver related complaints. Colon in Aug 2020 revelaed 5 small polyps (Hyperplastic and TA's), diverticulosis and hemorrhoids. HCC screen negative Aug 2020. The patient's goals for the shift include  ELIANE    The clinical goals for the shift include safety    Over the shift, the patient did not make progress toward the following goals.       Problem: Nutrition  Goal: Less than 5 days NPO/clear liquids  Outcome: Not Progressing  Goal: Oral intake greater than 50%  Outcome: Not Progressing  Goal: Oral intake greater 75%  Outcome: Not Progressing  Goal: Consume prescribed supplement  Outcome: Not Progressing  Goal: Adequate PO fluid intake  Outcome: Not Progressing  Goal: Nutrition support goals are met within 48 hrs  Outcome: Not Progressing  Goal: Nutrition support is meeting 75% of nutrient needs  Outcome: Not Progressing  Goal: Tube feed tolerance  Outcome: Not Progressing  Goal: BG  mg/dL  Outcome: Not Progressing  Goal: Lab values WNL  Outcome: Not Progressing  Goal: Electrolytes WNL  Outcome: Not Progressing  Goal: Promote healing  Outcome: Not Progressing  Goal: Maintain stable weight  Outcome: Not Progressing  Goal: Reduce weight from edema/fluid  Outcome: Not Progressing  Goal: Gradual weight gain  Outcome: Not Progressing  Goal: Improve ostomy output  Outcome: Not Progressing     Problem: Diabetes  Goal: Maintain glucose levels >70mg/dl to <250mg/dl throughout shift  Outcome: Not Progressing  Goal: No changes in neurological exam by end of shift  Outcome: Not Progressing  Goal: Vital signs within normal range for age by end of shift  Outcome: Not Progressing     Problem: Heart Failure  Goal: Improved gas exchange this shift  Outcome: Not Progressing  Goal: Improved urinary output this shift  Outcome: Not Progressing  Goal: Reduction in peripheral edema within 24 hours  Outcome: Not Progressing  Goal: Report improvement of dyspnea/breathlessness this shift  Outcome: Not Progressing  Goal: Weight from fluid excess reduced over 2-3 days, then stabilize  Outcome: Not Progressing  Goal: Increase self care and/or family involvement in 24 hours  Outcome: Not  Progressing     Problem: Pain  Goal: Takes deep breaths with improved pain control throughout the shift  Outcome: Not Progressing  Goal: Turns in bed with improved pain control throughout the shift  Outcome: Not Progressing  Goal: Walks with improved pain control throughout the shift  Outcome: Not Progressing  Goal: Performs ADL's with improved pain control throughout shift  Outcome: Not Progressing  Goal: Participates in PT with improved pain control throughout the shift  Outcome: Not Progressing  Goal: Free from opioid side effects throughout the shift  Outcome: Not Progressing  Goal: Free from acute confusion related to pain meds throughout the shift  Outcome: Not Progressing     Problem: Safety - Medical Restraint  Goal: Remains free of injury from restraints (Restraint for Interference with Medical Device)  Outcome: Not Progressing  Goal: Free from restraint(s) (Restraint for Interference with Medical Device)  Outcome: Not Progressing

## 2024-11-06 ENCOUNTER — TELEPHONE ENCOUNTER (OUTPATIENT)
Dept: URBAN - NONMETROPOLITAN AREA CLINIC 4 | Facility: CLINIC | Age: 64
End: 2024-11-06

## 2024-12-17 ENCOUNTER — ERX REFILL RESPONSE (OUTPATIENT)
Dept: URBAN - METROPOLITAN AREA CLINIC 54 | Facility: CLINIC | Age: 64
End: 2024-12-17

## 2024-12-17 RX ORDER — NADOLOL 20 MG/1
TAKE 2 TABLETS BY MOUTH EVERY DAY TABLET ORAL
Qty: 180 TABLET | Refills: 0 | OUTPATIENT

## 2025-02-14 ENCOUNTER — TELEPHONE ENCOUNTER (OUTPATIENT)
Dept: URBAN - NONMETROPOLITAN AREA CLINIC 4 | Facility: CLINIC | Age: 65
End: 2025-02-14